# Patient Record
Sex: MALE | Race: WHITE | Employment: UNEMPLOYED | URBAN - METROPOLITAN AREA
[De-identification: names, ages, dates, MRNs, and addresses within clinical notes are randomized per-mention and may not be internally consistent; named-entity substitution may affect disease eponyms.]

---

## 2022-05-11 ENCOUNTER — APPOINTMENT (OUTPATIENT)
Dept: GENERAL RADIOLOGY | Age: 57
End: 2022-05-11
Payer: COMMERCIAL

## 2022-05-11 ENCOUNTER — HOSPITAL ENCOUNTER (EMERGENCY)
Age: 57
Discharge: HOME | End: 2022-05-11
Attending: EMERGENCY MEDICINE
Payer: COMMERCIAL

## 2022-05-11 VITALS
OXYGEN SATURATION: 100 % | HEART RATE: 58 BPM | SYSTOLIC BLOOD PRESSURE: 181 MMHG | HEIGHT: 72 IN | TEMPERATURE: 98.6 F | WEIGHT: 215 LBS | RESPIRATION RATE: 16 BRPM | BODY MASS INDEX: 29.12 KG/M2 | DIASTOLIC BLOOD PRESSURE: 77 MMHG

## 2022-05-11 DIAGNOSIS — R07.9 CHEST PAIN, UNSPECIFIED TYPE: Primary | ICD-10-CM

## 2022-05-11 DIAGNOSIS — I10 HYPERTENSION, UNSPECIFIED TYPE: ICD-10-CM

## 2022-05-11 LAB
A/G RATIO: 1.4 (ref 1.1–2.2)
ALBUMIN SERPL-MCNC: 4.2 G/DL (ref 3.4–5)
ALP BLD-CCNC: 77 U/L (ref 40–129)
ALT SERPL-CCNC: 21 U/L (ref 10–40)
ANION GAP SERPL CALCULATED.3IONS-SCNC: 15 MMOL/L (ref 3–16)
AST SERPL-CCNC: 21 U/L (ref 15–37)
BASOPHILS ABSOLUTE: 0.1 K/UL (ref 0–0.2)
BASOPHILS RELATIVE PERCENT: 1.2 %
BILIRUB SERPL-MCNC: 0.4 MG/DL (ref 0–1)
BUN BLDV-MCNC: 9 MG/DL (ref 7–20)
CALCIUM SERPL-MCNC: 9.2 MG/DL (ref 8.3–10.6)
CHLORIDE BLD-SCNC: 95 MMOL/L (ref 99–110)
CO2: 20 MMOL/L (ref 21–32)
CREAT SERPL-MCNC: 1 MG/DL (ref 0.9–1.3)
EOSINOPHILS ABSOLUTE: 0.1 K/UL (ref 0–0.6)
EOSINOPHILS RELATIVE PERCENT: 0.9 %
GFR AFRICAN AMERICAN: >60
GFR NON-AFRICAN AMERICAN: >60
GLUCOSE BLD-MCNC: 118 MG/DL (ref 70–99)
HCT VFR BLD CALC: 40.3 % (ref 40.5–52.5)
HEMOGLOBIN: 14 G/DL (ref 13.5–17.5)
LYMPHOCYTES ABSOLUTE: 2 K/UL (ref 1–5.1)
LYMPHOCYTES RELATIVE PERCENT: 22.1 %
MCH RBC QN AUTO: 31.3 PG (ref 26–34)
MCHC RBC AUTO-ENTMCNC: 34.9 G/DL (ref 31–36)
MCV RBC AUTO: 89.8 FL (ref 80–100)
MONOCYTES ABSOLUTE: 0.7 K/UL (ref 0–1.3)
MONOCYTES RELATIVE PERCENT: 8.1 %
NEUTROPHILS ABSOLUTE: 6.1 K/UL (ref 1.7–7.7)
NEUTROPHILS RELATIVE PERCENT: 67.7 %
PDW BLD-RTO: 13 % (ref 12.4–15.4)
PLATELET # BLD: 323 K/UL (ref 135–450)
PMV BLD AUTO: 6.6 FL (ref 5–10.5)
POTASSIUM SERPL-SCNC: 3.7 MMOL/L (ref 3.5–5.1)
RBC # BLD: 4.48 M/UL (ref 4.2–5.9)
SODIUM BLD-SCNC: 130 MMOL/L (ref 136–145)
TOTAL PROTEIN: 7.3 G/DL (ref 6.4–8.2)
TROPONIN: <0.01 NG/ML
TROPONIN: <0.01 NG/ML
WBC # BLD: 9 K/UL (ref 4–11)

## 2022-05-11 PROCEDURE — 99285 EMERGENCY DEPT VISIT HI MDM: CPT

## 2022-05-11 PROCEDURE — 2580000003 HC RX 258: Performed by: EMERGENCY MEDICINE

## 2022-05-11 PROCEDURE — 84484 ASSAY OF TROPONIN QUANT: CPT

## 2022-05-11 PROCEDURE — 80053 COMPREHEN METABOLIC PANEL: CPT

## 2022-05-11 PROCEDURE — 93005 ELECTROCARDIOGRAM TRACING: CPT | Performed by: EMERGENCY MEDICINE

## 2022-05-11 PROCEDURE — 36415 COLL VENOUS BLD VENIPUNCTURE: CPT

## 2022-05-11 PROCEDURE — 6370000000 HC RX 637 (ALT 250 FOR IP): Performed by: EMERGENCY MEDICINE

## 2022-05-11 PROCEDURE — 85025 COMPLETE CBC W/AUTO DIFF WBC: CPT

## 2022-05-11 PROCEDURE — 71045 X-RAY EXAM CHEST 1 VIEW: CPT

## 2022-05-11 RX ORDER — FLUOXETINE 10 MG/1
10 CAPSULE ORAL DAILY
Status: ON HOLD | COMMUNITY
End: 2022-05-14

## 2022-05-11 RX ORDER — ONDANSETRON 4 MG/1
4 TABLET, ORALLY DISINTEGRATING ORAL ONCE
Status: COMPLETED | OUTPATIENT
Start: 2022-05-11 | End: 2022-05-11

## 2022-05-11 RX ORDER — ASPIRIN 81 MG/1
243 TABLET, CHEWABLE ORAL ONCE
Status: DISCONTINUED | OUTPATIENT
Start: 2022-05-11 | End: 2022-05-11

## 2022-05-11 RX ORDER — ASPIRIN 81 MG/1
324 TABLET, CHEWABLE ORAL ONCE
Status: DISCONTINUED | OUTPATIENT
Start: 2022-05-11 | End: 2022-05-11

## 2022-05-11 RX ORDER — LOSARTAN POTASSIUM 25 MG/1
25 TABLET ORAL ONCE
Status: COMPLETED | OUTPATIENT
Start: 2022-05-11 | End: 2022-05-11

## 2022-05-11 RX ORDER — LOSARTAN POTASSIUM 25 MG/1
50 TABLET ORAL DAILY
Qty: 60 TABLET | Refills: 0 | Status: ON HOLD | OUTPATIENT
Start: 2022-05-11 | End: 2022-05-14 | Stop reason: DRUGHIGH

## 2022-05-11 RX ORDER — SODIUM CHLORIDE 9 MG/ML
INJECTION, SOLUTION INTRAVENOUS CONTINUOUS
Status: DISCONTINUED | OUTPATIENT
Start: 2022-05-11 | End: 2022-05-12 | Stop reason: HOSPADM

## 2022-05-11 RX ORDER — LOSARTAN POTASSIUM 25 MG/1
25 TABLET ORAL DAILY
COMMUNITY
End: 2022-05-11 | Stop reason: SDUPTHER

## 2022-05-11 RX ADMIN — SODIUM CHLORIDE: 9 INJECTION, SOLUTION INTRAVENOUS at 21:05

## 2022-05-11 RX ADMIN — LOSARTAN POTASSIUM 25 MG: 25 TABLET, FILM COATED ORAL at 22:19

## 2022-05-11 RX ADMIN — NITROGLYCERIN 0.5 INCH: 20 OINTMENT TOPICAL at 19:55

## 2022-05-11 RX ADMIN — ONDANSETRON 4 MG: 4 TABLET, ORALLY DISINTEGRATING ORAL at 22:19

## 2022-05-11 ASSESSMENT — ENCOUNTER SYMPTOMS
WHEEZING: 0
VOMITING: 0
ABDOMINAL PAIN: 0
COUGH: 0
NAUSEA: 1
SHORTNESS OF BREATH: 1
COLOR CHANGE: 0

## 2022-05-11 ASSESSMENT — PAIN SCALES - GENERAL: PAINLEVEL_OUTOF10: 1

## 2022-05-11 ASSESSMENT — PAIN - FUNCTIONAL ASSESSMENT: PAIN_FUNCTIONAL_ASSESSMENT: 0-10

## 2022-05-11 ASSESSMENT — PAIN DESCRIPTION - LOCATION: LOCATION: CHEST

## 2022-05-11 NOTE — ED PROVIDER NOTES
Emergency Department Provider Note  Location: [facility] EMERGENCY DEPARTMENT  5/11/2022     Patient Identification  Pam Gonzalez is a 64 y.o. male    Chief Complaint  Chest Pain (CP that started yesterday, worse today. )      HPI  (History provided by patient)  This is a 64 y.o. male with a PMH significant for HTN presented today for chest pain. Patient reports substernal chest tightness that started yesterday while he was working on his broken . He checked his blood pressure and was elevated. Later on, the chest pain eases up and he felt better. Then this morning, he woke up and \"not feel right. \"  He said all day he feels fatigued, has minimal chest tightness rated 1/10 intensity, short of breath, and his hands felt clammy. Patient's wife initially said the patient took baby aspirin prior to arrival.  However later she said it was a 325 mg aspirin. ROS  Review of Systems   Constitutional: Positive for fatigue. HENT: Negative for congestion. Eyes: Negative for visual disturbance. Respiratory: Positive for shortness of breath. Negative for cough and wheezing. Cardiovascular: Positive for chest pain. Negative for palpitations and leg swelling. Gastrointestinal: Positive for nausea. Negative for abdominal pain and vomiting. Genitourinary: Negative for flank pain. Musculoskeletal: Negative for neck pain. Skin: Negative for color change. Patient said his hands feels clammy all day. Neurological: Negative for weakness and headaches. I have reviewed the following nursing documentation:  Allergies: No Known Allergies    Past medical history:  has a past medical history of Anxiety, Back pain, Gout, Hypertension, Routine health maintenance, and Sleep apnea. Past surgical history:  has a past surgical history that includes Endoscopy, colon, diagnostic (2015) and Colonoscopy (11/16/2015).     Home medications:   Prior to Admission medications    Medication Sig Start Date End Date Taking? Authorizing Provider   FLUoxetine (PROZAC) 10 MG capsule Take 10 mg by mouth daily   Yes Historical Provider, MD   losartan (COZAAR) 25 MG tablet Take 25 mg by mouth daily   Yes Historical Provider, MD       Social history:  reports that he has never smoked. His smokeless tobacco use includes snuff. He reports current alcohol use of about 12.5 standard drinks of alcohol per week. Family history:  History reviewed. No pertinent family history. Exam  ED Triage Vitals [05/11/22 1935]   BP Temp Temp src Pulse Resp SpO2 Height Weight   (!) 189/84 98.6 °F (37 °C) -- 75 17 100 % 6' (1.829 m) 215 lb (97.5 kg)   Physical Exam  Vitals and nursing note reviewed. Constitutional:       General: He is not in acute distress. Appearance: He is well-developed. He is not diaphoretic. HENT:      Head: Normocephalic and atraumatic. Eyes:      General: No scleral icterus. Right eye: No discharge. Left eye: No discharge. Conjunctiva/sclera: Conjunctivae normal.   Neck:      Trachea: No tracheal deviation. Cardiovascular:      Rate and Rhythm: Normal rate and regular rhythm. Heart sounds: Normal heart sounds. No murmur heard. Pulmonary:      Effort: Pulmonary effort is normal. No respiratory distress. Breath sounds: Normal breath sounds. No stridor. No wheezing. Chest:      Chest wall: No tenderness. Abdominal:      General: There is no distension. Palpations: Abdomen is soft. Tenderness: There is no abdominal tenderness. There is no guarding or rebound. Musculoskeletal:         General: No deformity. Cervical back: Neck supple. Right lower leg: No edema. Left lower leg: No edema. Skin:     General: Skin is warm and dry. Findings: No erythema or rash. Neurological:      Mental Status: He is alert and oriented to person, place, and time. Cranial Nerves: No dysarthria or facial asymmetry.    Psychiatric:         Mood and Affect: Mood and affect normal.         Behavior: Behavior normal. Behavior is cooperative. MDM/ED Course  ED Medication Orders (From admission, onward)    Start Ordered     Status Ordering Provider    05/11/22 2015 05/11/22 1948  nitroglycerin (NITRO-BID) 2 % ointment 0.5 inch  ONCE         Last MAR action: Given - by Tony Moore on 05/11/22 at [address] [place] [address]          EKG  The Ekg interpreted by me in the absence of a cardiologist shows. normal sinus rhythm with a rate of 78  Axis is   Normal  QTc is  normal  Intervals and Durations are unremarkable. No specific ST-T wave changes appreciated. No evidence of acute ischemia. No significant change from prior EKG dated 3/24/14    EKG#2  The Ekg interpreted by me in the absence of a cardiologist shows. normal sinus rhythm with a rate of 65  Axis is   Normal  QTc is  normal  Intervals and Durations are unremarkable. No specific ST-T wave changes appreciated. No evidence of acute ischemia. No significant change from prior EKG dated earlier today         Radiology  XR CHEST PORTABLE    Result Date: 5/11/2022  EXAMINATION: ONE XRAY VIEW OF THE CHEST 5/11/2022 7:50 pm COMPARISON: 09/09/2015. HISTORY: ORDERING SYSTEM PROVIDED HISTORY: CP TECHNOLOGIST PROVIDED HISTORY: Reason for exam:->CP Reason for Exam: Chest pain FINDINGS: The cardiomediastinal silhouette is unremarkable. Lungs are clear. No infiltrate, pleural fluid or evidence of overt failure. No acute cardiopulmonary disease.          Labs  Results for orders placed or performed during the hospital encounter of 05/11/22   CBC with Auto Differential   Result Value Ref Range    WBC 9.0 4.0 - 11.0 K/uL    RBC 4.48 4.20 - 5.90 M/uL    Hemoglobin 14.0 13.5 - 17.5 g/dL    Hematocrit 40.3 (L) 40.5 - 52.5 %    MCV 89.8 80.0 - 100.0 fL    MCH 31.3 26.0 - 34.0 pg    MCHC 34.9 31.0 - 36.0 g/dL    RDW 13.0 12.4 - 15.4 %    Platelets 383 313 - 190 K/uL    MPV 6.6 5.0 - 10.5 fL Neutrophils % 67.7 %    Lymphocytes % 22.1 %    Monocytes % 8.1 %    Eosinophils % 0.9 %    Basophils % 1.2 %    Neutrophils Absolute 6.1 1.7 - 7.7 K/uL    Lymphocytes Absolute 2.0 1.0 - 5.1 K/uL    Monocytes Absolute 0.7 0.0 - 1.3 K/uL    Eosinophils Absolute 0.1 0.0 - 0.6 K/uL    Basophils Absolute 0.1 0.0 - 0.2 K/uL   Comprehensive Metabolic Panel   Result Value Ref Range    Sodium 130 (L) 136 - 145 mmol/L    Potassium 3.7 3.5 - 5.1 mmol/L    Chloride 95 (L) 99 - 110 mmol/L    CO2 20 (L) 21 - 32 mmol/L    Anion Gap 15 3 - 16    Glucose 118 (H) 70 - 99 mg/dL    BUN 9 7 - 20 mg/dL    CREATININE 1.0 0.9 - 1.3 mg/dL    GFR Non-African American >60 >60    GFR African American >60 >60    Calcium 9.2 8.3 - 10.6 mg/dL    Total Protein 7.3 6.4 - 8.2 g/dL    Albumin 4.2 3.4 - 5.0 g/dL    Albumin/Globulin Ratio 1.4 1.1 - 2.2    Total Bilirubin 0.4 0.0 - 1.0 mg/dL    Alkaline Phosphatase 77 40 - 129 U/L    ALT 21 10 - 40 U/L    AST 21 15 - 37 U/L   Troponin   Result Value Ref Range    Troponin <0.01 <0.01 ng/mL   Troponin   Result Value Ref Range    Troponin <0.01 <0.01 ng/mL         - Patient seen and evaluated in room 1.  64 y.o. male presented for chest tightness, shortness of breath, fatigue. Chest pain was not reproducible on exam.  There was no unilateral leg swelling or pleuritic chest pain and therefore I am not concerned about PE.  - Patient was placed on telemetry during his/her ED stay and no malignant dysrhythmia observed. - Pertinent old records reviewed. Patient has not had a stress test since 2011    HEART SCORE:  History: +2 for high suspicion  EKG: +0 for normal EKG   Age: +1 for age 44-72 years  Risk factors (includes HLD, HTN, DM, tobacco use, obesity, and +FHx): +1 for 1-2 risk factors  Initial troponin: +0 for negative troponin    Heart score: 4.   This falls under the following category: Score of 4-6, which indicates low/moderate risk for major adverse cardiac event and supports observation with repeated troponins and/or non-invasive testing     I recommended admission to the hospital with the plan for a stress test tomorrow. However patient declined. He has about risks and benefit of such admission and after answer his question, he reaffirmed his decision to declined an admission. He subsequently discussed this with his wife and wife supportive of his decision as well. He did agree to a repeat EKG and troponin for further risk stratification. Both were normal and patient wanted to go home. I made it clear to the patient that he needs to contact with PCP as soon as possible to get further evaluation.  - Patient blood pressure is noted to be high. Patient said he was recently taken off his \"water pill\" after he was found to have low sodium. He was scheduled to have repeat blood work done to see if there is any improvement of his sodium level. Patient does not remember what was his prior lab value. Na 130 today. No confusion. I do not believe patient is at risk of seizure. Since patient is diuretic was discontinued and he has persistently elevated blood pressure here, I recommended that he we increase his losartan to 50 mg daily instead of 25 mg daily.  - Return precautions also discussed. patient verbalized understanding of care plan and agreed to follow-up with PCP as advised. I estimate there is LOW risk for ACUTE RESPIRATORY FAILURE, SEVERE COPD/ASTHMA EXACERBATION, ACUTE EXACERBATION OF CONGESTIVE HEART FAILURE, PERICARDIAL TAMPONADE, PNEUMONIA WITH HYPOXIA, PNEUMOTHORAX, PULMONARY EMBOLISM WITH RIGHT HEART STRAIN, SEPSIS, and THORACIC DISSECTION. I was concerned about ACS but after discussion with the patient and he made informed decisions, shared decision making was made and we agreed to discharge. Muriel Simms and I have discussed the diagnosis and risks, and we agree with discharging home to follow-up with PCP.   We also discussed returning to the Emergency Department immediately if new or worsening symptoms occur. We have discussed the symptoms which are most concerning (e.g., bloody sputum, fever, worsening pain or shortness of breath) that necessitate immediate return. Clinical Impression:  1. Chest pain, unspecified type    2. Hypertension, unspecified type          Disposition:  Discharge to home in good condition. Blood pressure (!) 181/77, pulse 58, temperature 98.6 °F (37 °C), resp. rate 16, height 6' (1.829 m), weight 215 lb (97.5 kg), SpO2 100 %. Patient was given scripts for the following medications. I counseled patient how to take these medications. Discharge Medication List as of 5/11/2022 10:13 PM      CONTINUE these medications which have CHANGED    Details   losartan (COZAAR) 25 MG tablet Take 2 tablets by mouth daily, Disp-60 tablet, R-0Normal               Disposition referral (if applicable):  Sissy Crews  [address] [address]  [phone]    Schedule an appointment as soon as possible for a visit in [address] [place] [address]. [facility] Emergency Department  [address] [place] [address],[address]  [phone]    As needed, If symptoms worsen        This chart was generated in part by using Dragon Dictation system and may contain errors related to that system including errors in grammar, punctuation, and spelling, as well as words and phrases that may be inappropriate. If there are any questions or concerns please feel free to contact the dictating provider for clarification.      Bob Dubon MD  [facility] Walter Glez MD  05/13/22 2288

## 2022-05-12 LAB
EKG ATRIAL RATE: 65 BPM
EKG ATRIAL RATE: 78 BPM
EKG DIAGNOSIS: NORMAL
EKG DIAGNOSIS: NORMAL
EKG P AXIS: 14 DEGREES
EKG P AXIS: 14 DEGREES
EKG P-R INTERVAL: 144 MS
EKG P-R INTERVAL: 144 MS
EKG Q-T INTERVAL: 416 MS
EKG Q-T INTERVAL: 454 MS
EKG QRS DURATION: 96 MS
EKG QRS DURATION: 96 MS
EKG QTC CALCULATION (BAZETT): 472 MS
EKG QTC CALCULATION (BAZETT): 474 MS
EKG R AXIS: 31 DEGREES
EKG R AXIS: 45 DEGREES
EKG T AXIS: 47 DEGREES
EKG T AXIS: 51 DEGREES
EKG VENTRICULAR RATE: 65 BPM
EKG VENTRICULAR RATE: 78 BPM

## 2022-05-14 ENCOUNTER — HOSPITAL ENCOUNTER (INPATIENT)
Age: 57
LOS: 2 days | Discharge: HOME | DRG: 311 | End: 2022-05-16
Attending: INTERNAL MEDICINE | Admitting: INTERNAL MEDICINE
Payer: COMMERCIAL

## 2022-05-14 PROBLEM — R07.9 CHEST PAIN: Status: ACTIVE | Noted: 2022-05-14

## 2022-05-14 LAB
ANION GAP SERPL CALCULATED.3IONS-SCNC: 11 MMOL/L (ref 3–16)
BILIRUBIN URINE: NEGATIVE
BLOOD, URINE: NEGATIVE
BUN BLDV-MCNC: 9 MG/DL (ref 7–20)
CALCIUM SERPL-MCNC: 9.1 MG/DL (ref 8.3–10.6)
CHLORIDE BLD-SCNC: 97 MMOL/L (ref 99–110)
CHLORIDE URINE RANDOM: 53 MMOL/L
CLARITY: CLEAR
CO2: 22 MMOL/L (ref 21–32)
COLOR: YELLOW
CREAT SERPL-MCNC: 1 MG/DL (ref 0.9–1.3)
CREATININE URINE: 49.2 MG/DL (ref 39–259)
GFR AFRICAN AMERICAN: >60
GFR NON-AFRICAN AMERICAN: >60
GLUCOSE BLD-MCNC: 122 MG/DL (ref 70–99)
GLUCOSE URINE: NEGATIVE MG/DL
HCT VFR BLD CALC: 39.7 % (ref 40.5–52.5)
HEMOGLOBIN: 13.7 G/DL (ref 13.5–17.5)
KETONES, URINE: NEGATIVE MG/DL
LEUKOCYTE ESTERASE, URINE: NEGATIVE
MCH RBC QN AUTO: 31.8 PG (ref 26–34)
MCHC RBC AUTO-ENTMCNC: 34.4 G/DL (ref 31–36)
MCV RBC AUTO: 92.5 FL (ref 80–100)
MICROSCOPIC EXAMINATION: NORMAL
NITRITE, URINE: NEGATIVE
PDW BLD-RTO: 12.7 % (ref 12.4–15.4)
PH UA: 6 (ref 5–8)
PLATELET # BLD: 275 K/UL (ref 135–450)
PMV BLD AUTO: 7.1 FL (ref 5–10.5)
POTASSIUM REFLEX MAGNESIUM: 4.6 MMOL/L (ref 3.5–5.1)
POTASSIUM, UR: 7.8 MMOL/L
PROTEIN UA: NEGATIVE MG/DL
RBC # BLD: 4.3 M/UL (ref 4.2–5.9)
SODIUM BLD-SCNC: 130 MMOL/L (ref 136–145)
SODIUM URINE: 49 MMOL/L
SPECIFIC GRAVITY UA: <=1.005 (ref 1–1.03)
TROPONIN: <0.01 NG/ML
TROPONIN: <0.01 NG/ML
URINE REFLEX TO CULTURE: NORMAL
URINE TYPE: NORMAL
UROBILINOGEN, URINE: 0.2 E.U./DL
WBC # BLD: 8.4 K/UL (ref 4–11)

## 2022-05-14 PROCEDURE — 6370000000 HC RX 637 (ALT 250 FOR IP): Performed by: INTERNAL MEDICINE

## 2022-05-14 PROCEDURE — 80061 LIPID PANEL: CPT

## 2022-05-14 PROCEDURE — 1200000000 HC SEMI PRIVATE

## 2022-05-14 PROCEDURE — 81003 URINALYSIS AUTO W/O SCOPE: CPT

## 2022-05-14 PROCEDURE — 36415 COLL VENOUS BLD VENIPUNCTURE: CPT

## 2022-05-14 PROCEDURE — 85027 COMPLETE CBC AUTOMATED: CPT

## 2022-05-14 PROCEDURE — 84300 ASSAY OF URINE SODIUM: CPT

## 2022-05-14 PROCEDURE — 82570 ASSAY OF URINE CREATININE: CPT

## 2022-05-14 PROCEDURE — 99223 1ST HOSP IP/OBS HIGH 75: CPT | Performed by: INTERNAL MEDICINE

## 2022-05-14 PROCEDURE — 96372 THER/PROPH/DIAG INJ SC/IM: CPT

## 2022-05-14 PROCEDURE — 84484 ASSAY OF TROPONIN QUANT: CPT

## 2022-05-14 PROCEDURE — 83930 ASSAY OF BLOOD OSMOLALITY: CPT

## 2022-05-14 PROCEDURE — 83935 ASSAY OF URINE OSMOLALITY: CPT

## 2022-05-14 PROCEDURE — 80048 BASIC METABOLIC PNL TOTAL CA: CPT

## 2022-05-14 PROCEDURE — 82436 ASSAY OF URINE CHLORIDE: CPT

## 2022-05-14 PROCEDURE — 96374 THER/PROPH/DIAG INJ IV PUSH: CPT

## 2022-05-14 PROCEDURE — 6360000002 HC RX W HCPCS: Performed by: INTERNAL MEDICINE

## 2022-05-14 PROCEDURE — G0378 HOSPITAL OBSERVATION PER HR: HCPCS

## 2022-05-14 PROCEDURE — 2580000003 HC RX 258: Performed by: INTERNAL MEDICINE

## 2022-05-14 PROCEDURE — 84133 ASSAY OF URINE POTASSIUM: CPT

## 2022-05-14 PROCEDURE — G0379 DIRECT REFER HOSPITAL OBSERV: HCPCS

## 2022-05-14 RX ORDER — SODIUM CHLORIDE 0.9 % (FLUSH) 0.9 %
5-40 SYRINGE (ML) INJECTION PRN
Status: DISCONTINUED | OUTPATIENT
Start: 2022-05-14 | End: 2022-05-16 | Stop reason: HOSPADM

## 2022-05-14 RX ORDER — ASPIRIN 81 MG/1
81 TABLET, CHEWABLE ORAL DAILY
COMMUNITY

## 2022-05-14 RX ORDER — ENOXAPARIN SODIUM 100 MG/ML
40 INJECTION SUBCUTANEOUS DAILY
Status: DISCONTINUED | OUTPATIENT
Start: 2022-05-14 | End: 2022-05-16 | Stop reason: HOSPADM

## 2022-05-14 RX ORDER — FLUOXETINE HYDROCHLORIDE 60 MG/1
60 TABLET, FILM COATED ORAL; ORAL DAILY
COMMUNITY

## 2022-05-14 RX ORDER — HYDRALAZINE HYDROCHLORIDE 20 MG/ML
10 INJECTION INTRAMUSCULAR; INTRAVENOUS EVERY 8 HOURS PRN
Status: DISCONTINUED | OUTPATIENT
Start: 2022-05-14 | End: 2022-05-14

## 2022-05-14 RX ORDER — ACETAMINOPHEN 650 MG/1
650 SUPPOSITORY RECTAL EVERY 6 HOURS PRN
Status: DISCONTINUED | OUTPATIENT
Start: 2022-05-14 | End: 2022-05-16 | Stop reason: HOSPADM

## 2022-05-14 RX ORDER — SODIUM CHLORIDE 0.9 % (FLUSH) 0.9 %
5-40 SYRINGE (ML) INJECTION EVERY 12 HOURS SCHEDULED
Status: DISCONTINUED | OUTPATIENT
Start: 2022-05-14 | End: 2022-05-16 | Stop reason: HOSPADM

## 2022-05-14 RX ORDER — LOSARTAN POTASSIUM 100 MG/1
100 TABLET ORAL DAILY
Status: DISCONTINUED | OUTPATIENT
Start: 2022-05-14 | End: 2022-05-16 | Stop reason: HOSPADM

## 2022-05-14 RX ORDER — SODIUM CHLORIDE 9 MG/ML
INJECTION, SOLUTION INTRAVENOUS PRN
Status: DISCONTINUED | OUTPATIENT
Start: 2022-05-14 | End: 2022-05-16 | Stop reason: HOSPADM

## 2022-05-14 RX ORDER — LOSARTAN POTASSIUM 100 MG/1
100 TABLET ORAL DAILY
COMMUNITY

## 2022-05-14 RX ORDER — FLUOXETINE HYDROCHLORIDE 20 MG/1
60 CAPSULE ORAL DAILY
Status: DISCONTINUED | OUTPATIENT
Start: 2022-05-14 | End: 2022-05-16 | Stop reason: HOSPADM

## 2022-05-14 RX ORDER — CLONIDINE HYDROCHLORIDE 0.1 MG/1
0.1 TABLET ORAL 2 TIMES DAILY
COMMUNITY

## 2022-05-14 RX ORDER — POLYETHYLENE GLYCOL 3350 17 G/17G
17 POWDER, FOR SOLUTION ORAL DAILY PRN
Status: DISCONTINUED | OUTPATIENT
Start: 2022-05-14 | End: 2022-05-16 | Stop reason: HOSPADM

## 2022-05-14 RX ORDER — ASPIRIN 81 MG/1
81 TABLET, CHEWABLE ORAL DAILY
Status: DISCONTINUED | OUTPATIENT
Start: 2022-05-15 | End: 2022-05-16 | Stop reason: HOSPADM

## 2022-05-14 RX ORDER — ONDANSETRON 2 MG/ML
4 INJECTION INTRAMUSCULAR; INTRAVENOUS EVERY 6 HOURS PRN
Status: DISCONTINUED | OUTPATIENT
Start: 2022-05-14 | End: 2022-05-16 | Stop reason: HOSPADM

## 2022-05-14 RX ORDER — ATORVASTATIN CALCIUM 40 MG/1
40 TABLET, FILM COATED ORAL NIGHTLY
Status: DISCONTINUED | OUTPATIENT
Start: 2022-05-14 | End: 2022-05-16 | Stop reason: HOSPADM

## 2022-05-14 RX ORDER — GAUZE BANDAGE 2" X 2"
100 BANDAGE TOPICAL DAILY
Status: DISCONTINUED | OUTPATIENT
Start: 2022-05-14 | End: 2022-05-16 | Stop reason: HOSPADM

## 2022-05-14 RX ORDER — CLONIDINE HYDROCHLORIDE 0.1 MG/1
0.1 TABLET ORAL 3 TIMES DAILY
Status: DISCONTINUED | OUTPATIENT
Start: 2022-05-14 | End: 2022-05-16 | Stop reason: HOSPADM

## 2022-05-14 RX ORDER — M-VIT,TX,IRON,MINS/CALC/FOLIC 27MG-0.4MG
1 TABLET ORAL DAILY
Status: DISCONTINUED | OUTPATIENT
Start: 2022-05-14 | End: 2022-05-16 | Stop reason: HOSPADM

## 2022-05-14 RX ORDER — HYDRALAZINE HYDROCHLORIDE 20 MG/ML
10 INJECTION INTRAMUSCULAR; INTRAVENOUS EVERY 4 HOURS PRN
Status: DISCONTINUED | OUTPATIENT
Start: 2022-05-14 | End: 2022-05-16 | Stop reason: HOSPADM

## 2022-05-14 RX ORDER — AMLODIPINE BESYLATE 5 MG/1
5 TABLET ORAL DAILY
COMMUNITY

## 2022-05-14 RX ORDER — AMLODIPINE BESYLATE 5 MG/1
5 TABLET ORAL DAILY
Status: DISCONTINUED | OUTPATIENT
Start: 2022-05-14 | End: 2022-05-14

## 2022-05-14 RX ORDER — ONDANSETRON 4 MG/1
4 TABLET, ORALLY DISINTEGRATING ORAL EVERY 8 HOURS PRN
Status: DISCONTINUED | OUTPATIENT
Start: 2022-05-14 | End: 2022-05-16 | Stop reason: HOSPADM

## 2022-05-14 RX ORDER — ACETAMINOPHEN 325 MG/1
650 TABLET ORAL EVERY 6 HOURS PRN
Status: DISCONTINUED | OUTPATIENT
Start: 2022-05-14 | End: 2022-05-16 | Stop reason: HOSPADM

## 2022-05-14 RX ORDER — HYDROCHLOROTHIAZIDE 25 MG/1
25 TABLET ORAL DAILY
Status: ON HOLD | COMMUNITY
End: 2022-05-14

## 2022-05-14 RX ORDER — AMLODIPINE BESYLATE 5 MG/1
10 TABLET ORAL DAILY
Status: DISCONTINUED | OUTPATIENT
Start: 2022-05-14 | End: 2022-05-16 | Stop reason: HOSPADM

## 2022-05-14 RX ADMIN — SODIUM CHLORIDE, PRESERVATIVE FREE 10 ML: 5 INJECTION INTRAVENOUS at 20:09

## 2022-05-14 RX ADMIN — ATORVASTATIN CALCIUM 40 MG: 40 TABLET, FILM COATED ORAL at 20:09

## 2022-05-14 RX ADMIN — AMLODIPINE BESYLATE 10 MG: 5 TABLET ORAL at 13:54

## 2022-05-14 RX ADMIN — ACETAMINOPHEN 650 MG: 325 TABLET ORAL at 18:16

## 2022-05-14 RX ADMIN — LOSARTAN POTASSIUM 100 MG: 100 TABLET, FILM COATED ORAL at 13:54

## 2022-05-14 RX ADMIN — Medication 1 TABLET: at 13:54

## 2022-05-14 RX ADMIN — FLUOXETINE 60 MG: 20 CAPSULE ORAL at 13:54

## 2022-05-14 RX ADMIN — SODIUM CHLORIDE, PRESERVATIVE FREE 10 ML: 5 INJECTION INTRAVENOUS at 13:55

## 2022-05-14 RX ADMIN — ENOXAPARIN SODIUM 40 MG: 100 INJECTION SUBCUTANEOUS at 13:54

## 2022-05-14 RX ADMIN — HYDRALAZINE HYDROCHLORIDE 10 MG: 20 INJECTION INTRAMUSCULAR; INTRAVENOUS at 17:14

## 2022-05-14 RX ADMIN — CLONIDINE HYDROCHLORIDE 0.1 MG: 0.1 TABLET ORAL at 20:09

## 2022-05-14 RX ADMIN — THIAMINE HCL TAB 100 MG 100 MG: 100 TAB at 13:54

## 2022-05-14 ASSESSMENT — PAIN SCALES - GENERAL
PAINLEVEL_OUTOF10: 0
PAINLEVEL_OUTOF10: 0
PAINLEVEL_OUTOF10: 3
PAINLEVEL_OUTOF10: 0

## 2022-05-14 ASSESSMENT — PAIN DESCRIPTION - LOCATION: LOCATION: HEAD

## 2022-05-14 NOTE — CONSULTS
[facility]   Cardiology Consultation   Date: 5/14/2022  Reason for Consultation: Chest pain  Consult Requesting Physician: Sharan Thomson MD     No chief complaint on file. HPI: Tyler Mcdonald is a 64 y.o. male with history of hypertension, mild sleep apnea was working on his broken  on 5/10/2022 when he noticed that he started having substernal chest pain. His blood pressure was elevated. The chest pain eased up but then he woke up the next day and he was not feeling right. He was feeling fatigued all day. He had chest tightness that rated 1/10 throughout the day with some shortness of breath. [facility] emergency room his blood pressure was 189/84. He was advised to be admitted but declined and went home    He continued to have chest pain with radiation to left arm and neck and went back to ER and transferred here. He was on losartan -HCTZ for several years but noted to be hyponatremic and it was sopped and he was advised to cut down n beer. Past Medical History:   Diagnosis Date    Anxiety     Back pain     Gout     Hypertension     Routine health maintenance     echo 11/07,     Sleep apnea     mild, sleep study 11/07        Past Surgical History:   Procedure Laterality Date    COLONOSCOPY  11/16/2015    diverticulosis    ENDOSCOPY, COLON, DIAGNOSTIC  2015    EGD Gastritis       No Known Allergies    Social History:  Reviewed. reports that he has never smoked. His smokeless tobacco use includes snuff. He reports current alcohol use of about 12.5 standard drinks of alcohol per week. Family History:  Reviewed. Family Hx was Reviewed. No relevant family history is present. Review of System:  All other systems reviewed and are negative except for that noted above.  Pertinent negatives are:     · General: negative for fever, chills   · Ophthalmic ROS: negative for - eye pain or loss of vision  · ENT ROS: negative for - headaches, sore throat · Respiratory: negative for - cough, sputum  · Cardiovascular: Reviewed in HPI  · Gastrointestinal: negative for - abdominal pain, diarrhea, N/V  · Hematology: negative for - bleeding, blood clots, bruising or jaundice  · Genito-Urinary:  negative for - Dysuria or incontinence  · Musculoskeletal: negative for - Joint swelling, muscle pain  · Neurological: negative for - confusion, dizziness, headaches   · Psychiatric: No anxiety, no depression. · Dermatological: negative for - rash    Physical Examination:  Vitals:    22 1000   BP: (!) 156/77   Pulse: 58   Resp: 16   Temp: 98.4 °F (36.9 °C)   SpO2: 98%      No intake/output data recorded. Wt Readings from Last 3 Encounters:   22 212 lb (96.2 kg)   22 215 lb (97.5 kg)   11/16/15 210 lb (95.3 kg)     Temp  Av.4 °F (36.9 °C)  Min: 98.4 °F (36.9 °C)  Max: 98.4 °F (36.9 °C)  Pulse  Av  Min: 58  Max: 58  BP  Min: 156/77  Max: 156/77  SpO2  Av %  Min: 98 %  Max: 98 %  No intake or output data in the 24 hours ending 22 1125    · Telemetry: Sinus rhythm   · Constitutional: Oriented. No distress. · Head: Normocephalic and atraumatic. · Mouth/Throat: Oropharynx is clear and moist.   · Eyes: Conjunctivae normal. EOM are normal.   · Neck: Neck supple. No rigidity. No JVD present. · Cardiovascular: Normal rate, regular rhythm, S1&S2. · Pulmonary/Chest: Bilateral respiratory sounds. No wheezes, No rhonchi. · Abdominal: Soft. Bowel sounds present. No distension, No tenderness. · Musculoskeletal: No tenderness. No edema    · Lymphadenopathy: Has no cervical adenopathy. · Neurological: Alert and oriented. Cranial nerve appears intact, No Gross deficit   · Skin: Skin is warm and dry. No rash noted. · Psychiatric: Has a normal behavior     Labs, diagnostic and imaging results reviewed. Reviewed.    Recent Labs     22   *   K 3.7   CL 95*   CO2 20*   BUN 9   CREATININE 1.0     Recent Labs 05/11/22  1940 05/14/22  1110   WBC 9.0 8.4   HGB 14.0 13.7   HCT 40.3* 39.7*   MCV 89.8 92.5    275     Lab Results   Component Value Date    TROPONINI <0.01 05/11/2022     No results found for: BNP  No results found for: PROTIME, INR  Lab Results   Component Value Date    CHOL 237 05/04/2015    HDL 58 05/04/2015    HDL 64 07/06/2010    TRIG 152 05/04/2015       ECG: Normal sinus rhythm,   Echo:   Cath:   Stress test 3/21/2011     Impression-   1. No evidence of reversible perfusion defect to suggest   myocardial ischemia. 2. LVEF 77 %       Scheduled Meds:   FLUoxetine  10 mg Oral Daily    sodium chloride flush  5-40 mL IntraVENous 2 times per day    [START ON 5/15/2022] aspirin  81 mg Oral Daily    atorvastatin  40 mg Oral Nightly    enoxaparin  40 mg SubCUTAneous Daily    sodium chloride flush  5-40 mL IntraVENous 2 times per day    amLODIPine  10 mg Oral Daily     Continuous Infusions:   sodium chloride      sodium chloride       PRN Meds:.sodium chloride flush, sodium chloride, ondansetron **OR** ondansetron, acetaminophen **OR** acetaminophen, polyethylene glycol, perflutren lipid microspheres, sodium chloride flush, sodium chloride     Patient Active Problem List    Diagnosis Date Noted    Chest pain 05/14/2022    Essential hypertension 09/04/2015    Lumbar disc disease with radiculopathy 05/04/2015    GERD (gastroesophageal reflux disease) 11/27/2012    Testosterone deficiency 07/12/2012    ED (erectile dysfunction) 08/08/2011    Hyperglycemia 07/07/2010    Gout 02/16/2010    Anxiety     Sleep apnea       Active Hospital Problems    Diagnosis Date Noted    Chest pain [R07.9] 05/14/2022     Priority: Medium       Assessment:       Plan:    -Chest pain  Troponin is negative. EKG is unremarkable. It has some typical features. Given that he is middle-age male with history of hypertension and hyperlipidemia, he has risk of coronary disease.   We will proceed with a stress test.      -Hypertension    Blood pressure is better controlled. Continue current management and adjust medications as needed. -Hyperlipidemia    Continue atorvastatin. -Hyponatremia  Adequate intake. Stop drinking beer. - alcohol use    Advised to quit. Thank you for allowing me to participate in the care of Hai Stevens     NOTE: This report was transcribed using voice recognition software. Every effort was made to ensure accuracy, however, inadvertent computerized transcription errors may be present.

## 2022-05-14 NOTE — H&P
HOSPITALISTS HISTORY AND PHYSICAL    5/14/2022 3:10 PM    Patient Information:  David Mcdaniel is a 64 y.o. male [id number]  PCP:  Cuca Clarke (Tel: [phone] )    Chief complaint:  No chief complaint on file. History of Present Illness:  Pepe Gomez is a 64 y.o. male  with PMHx of anxiety, back pain, hypertension and ERNESTO transferred from [facility] for evaluation of chest pain. Per patient's report, he has been experiencing intermittent chest pressure/pain from the past 2 days. Initially chest pain started while he was working on his broken washer on 5/10/2022 for which he went to [facility] ED where he was found to have elevated BP. Patient was advised to be admitted for management of elevated blood pressure and cardiology work-up but patient decided to go home. Patient continued to have intermittent chest pain with radiation to left arm and neck the next day and went back to the ER and was transferred to [facility] for further work-up. Patient denied any fever, chills, palpitations, shortness of breath PND, orthopnea, BLE edema, bowel or bladder dysfunction, recent travel, sick contact, any previous history of heart disease. REVIEW OF SYSTEMS:   Detailed 12 point ROS obtained which were negative except what mentioned above in HPI    Past Medical History:   has a past medical history of Anxiety, Back pain, Gout, Hypertension, Routine health maintenance, and Sleep apnea. Past Surgical History:   has a past surgical history that includes Endoscopy, colon, diagnostic (2015) and Colonoscopy (11/16/2015). Medications:  No current facility-administered medications on file prior to encounter.      Current Outpatient Medications on File Prior to Encounter   Medication Sig Dispense Refill    aspirin 81 MG chewable tablet Take 81 mg by mouth daily      FLUoxetine HCl 60 MG TABS Take 60 mg by mouth daily      losartan (COZAAR) 100 MG tablet Take 100 mg by mouth daily      amLODIPine (NORVASC) 5 MG tablet Take 5 mg by mouth daily      cloNIDine (CATAPRES) 0.1 MG tablet Take 0.1 mg by mouth 2 times daily For BP greater than 170/100         Allergies:  No Known Allergies     Social History:   reports that he has never smoked. His smokeless tobacco use includes snuff. He reports current alcohol use of about 12.5 standard drinks of alcohol per week. Family History:  family history is not on file. Physical Exam:  BP (!) 156/88   Pulse 65   Temp 98.5 °F (36.9 °C) (Oral)   Resp 16   Ht 6' (1.829 m)   Wt 212 lb (96.2 kg)   SpO2 99%   BMI 28.75 kg/m²     General appearance: No apparent distress appears stated age and cooperative. HEENT Normal cephalic, atraumatic without obvious deformity. Pupils equal, round, and reactive to light. Extra ocular muscles intact. Conjunctivae/corneas clear. Neck: Supple, No jugular venous distention/bruits. Trachea midline without thyromegaly or adenopathy   Lungs: Clear to auscultation, bilaterally without Rales/Wheezes/Rhonchi with good respiratory effort. Heart: Regular rate and rhythm with Normal S1/S2 without murmurs, rubs or gallops  Abdomen: Soft, non-tender or non-distended without rigidity or guarding and positive bowel sounds all four quadrants. Extremities: No clubbing, cyanosis, or edema bilaterally. Full range of motion without deformity and normal gait intact. Skin: Skin color, texture, turgor normal.  No rashes or lesions. Neurologic: Alert and oriented X 3, neurovascularly intact with sensory/motor intact upper extremities/lower extremities, bilaterally. Cranial nerves: II-XII intact, grossly non-focal.  Psychiatry: Appropriate affect.  Not agitated  Skin: Warm, dry, normal turgor, no rash  Brisk capillary refill, peripheral pulses palpable     Labs:  CBC:   Lab Results   Component Value Date    WBC 8.4 05/14/2022    RBC 4.30 05/14/2022    HGB 13.7 05/14/2022    HCT 39.7 05/14/2022    MCV 92.5 05/14/2022    MCH 31.8 05/14/2022    MCHC 34.4 05/14/2022    RDW 12.7 05/14/2022     05/14/2022    MPV 7.1 05/14/2022     BMP:    Lab Results   Component Value Date     05/14/2022    K 4.6 05/14/2022    CL 97 05/14/2022    CO2 22 05/14/2022    BUN 9 05/14/2022    CREATININE 1.0 05/14/2022    CALCIUM 9.1 05/14/2022    GFRAA >60 05/14/2022    GFRAA >60 04/03/2013    LABGLOM >60 05/14/2022    GLUCOSE 122 05/14/2022     NM MYOCARDIAL SPECT REST EXERCISE OR RX    (Results Pending)       Discussed case  with ED physician    Problem List  Principal Problem:    Chest pain  Active Problems:    Mixed hyperlipidemia    Hyponatremia    Benign essential HTN  Resolved Problems:    * No resolved hospital problems. *        Assessment/Plan:     Chest pain: Atypical versus ACS  Initial troponin negative, EKG unremarkable  Cardiology consulted: Appreciate the recs  Plan for cardiac stress test  Continue to monitor on telemetry and trend troponins    Hypertension: Continue amlodipine and losartan  Monitor BP closely    Hyperlipidemia: Continue statins    Hyponatremia likely 2/2 alcohol abuse/HCTZ: Serum sodium 130 on admission  Per patient report he has been taken off of HCTZ and he has been trying to cut beer  Urine, serum osmole and urine electrolytes studies ordered  Monitor serum sodium level closely    Hx of alcohol abuse: Denies any  Hx of alcohol withdrawal in the past  patient counseled on alcohol cessation; stated that he has cut down from 7-8 beers to 2 to 3/day but still not ready to give up his brace completely. Beer x2 ordered with meals   CIWA protocol, seizure precautions and multivitamins ordered  Monitor for withdrawal       DVT prophylaxis: Lovenox  Code status: Full    Admit as inpatient. I anticipate hospitalization spanning less than two midnights for investigation and treatment of the above medically necessary diagnoses.     Please note that some part of this chart was generated using Dragon dictation software. Although every effort was made to ensure the accuracy of this automated transcription, some errors in transcription may have occurred inadvertently. If you may need any clarification, please do not hesitate to contact me through [facility].        Kyler Montes MD    5/14/2022 3:10 PM

## 2022-05-15 LAB
ANION GAP SERPL CALCULATED.3IONS-SCNC: 12 MMOL/L (ref 3–16)
BUN BLDV-MCNC: 14 MG/DL (ref 7–20)
CALCIUM SERPL-MCNC: 9.1 MG/DL (ref 8.3–10.6)
CHLORIDE BLD-SCNC: 98 MMOL/L (ref 99–110)
CHOLESTEROL, TOTAL: 183 MG/DL (ref 0–199)
CO2: 22 MMOL/L (ref 21–32)
CREAT SERPL-MCNC: 1 MG/DL (ref 0.9–1.3)
EKG ATRIAL RATE: 62 BPM
EKG DIAGNOSIS: NORMAL
EKG P AXIS: 42 DEGREES
EKG P-R INTERVAL: 152 MS
EKG Q-T INTERVAL: 408 MS
EKG QRS DURATION: 100 MS
EKG QTC CALCULATION (BAZETT): 414 MS
EKG R AXIS: 41 DEGREES
EKG T AXIS: 20 DEGREES
EKG VENTRICULAR RATE: 62 BPM
GFR AFRICAN AMERICAN: >60
GFR NON-AFRICAN AMERICAN: >60
GLUCOSE BLD-MCNC: 115 MG/DL (ref 70–99)
HDLC SERPL-MCNC: 52 MG/DL (ref 40–60)
LDL CHOLESTEROL CALCULATED: 115 MG/DL
OSMOLALITY: 278 MOSM/KG (ref 275–295)
POTASSIUM SERPL-SCNC: 4 MMOL/L (ref 3.5–5.1)
SODIUM BLD-SCNC: 132 MMOL/L (ref 136–145)
TRIGL SERPL-MCNC: 78 MG/DL (ref 0–150)
VLDLC SERPL CALC-MCNC: 16 MG/DL

## 2022-05-15 PROCEDURE — 6370000000 HC RX 637 (ALT 250 FOR IP): Performed by: INTERNAL MEDICINE

## 2022-05-15 PROCEDURE — 93005 ELECTROCARDIOGRAM TRACING: CPT | Performed by: INTERNAL MEDICINE

## 2022-05-15 PROCEDURE — 96372 THER/PROPH/DIAG INJ SC/IM: CPT

## 2022-05-15 PROCEDURE — 6360000002 HC RX W HCPCS: Performed by: INTERNAL MEDICINE

## 2022-05-15 PROCEDURE — G0378 HOSPITAL OBSERVATION PER HR: HCPCS

## 2022-05-15 PROCEDURE — 36415 COLL VENOUS BLD VENIPUNCTURE: CPT

## 2022-05-15 PROCEDURE — 93010 ELECTROCARDIOGRAM REPORT: CPT | Performed by: INTERNAL MEDICINE

## 2022-05-15 PROCEDURE — 2580000003 HC RX 258: Performed by: INTERNAL MEDICINE

## 2022-05-15 PROCEDURE — 80048 BASIC METABOLIC PNL TOTAL CA: CPT

## 2022-05-15 PROCEDURE — 99233 SBSQ HOSP IP/OBS HIGH 50: CPT | Performed by: NURSE PRACTITIONER

## 2022-05-15 PROCEDURE — 1200000000 HC SEMI PRIVATE

## 2022-05-15 PROCEDURE — 99223 1ST HOSP IP/OBS HIGH 75: CPT | Performed by: INTERNAL MEDICINE

## 2022-05-15 RX ADMIN — CLONIDINE HYDROCHLORIDE 0.1 MG: 0.1 TABLET ORAL at 20:38

## 2022-05-15 RX ADMIN — Medication 1 TABLET: at 09:18

## 2022-05-15 RX ADMIN — SODIUM CHLORIDE, PRESERVATIVE FREE 10 ML: 5 INJECTION INTRAVENOUS at 09:19

## 2022-05-15 RX ADMIN — Medication 10 ML: at 20:40

## 2022-05-15 RX ADMIN — ASPIRIN 81 MG 81 MG: 81 TABLET ORAL at 09:19

## 2022-05-15 RX ADMIN — THIAMINE HCL TAB 100 MG 100 MG: 100 TAB at 09:18

## 2022-05-15 RX ADMIN — CLONIDINE HYDROCHLORIDE 0.1 MG: 0.1 TABLET ORAL at 16:14

## 2022-05-15 RX ADMIN — AMLODIPINE BESYLATE 10 MG: 5 TABLET ORAL at 09:18

## 2022-05-15 RX ADMIN — CLONIDINE HYDROCHLORIDE 0.1 MG: 0.1 TABLET ORAL at 09:18

## 2022-05-15 RX ADMIN — FLUOXETINE 60 MG: 20 CAPSULE ORAL at 09:18

## 2022-05-15 RX ADMIN — ENOXAPARIN SODIUM 40 MG: 100 INJECTION SUBCUTANEOUS at 09:17

## 2022-05-15 RX ADMIN — LOSARTAN POTASSIUM 100 MG: 100 TABLET, FILM COATED ORAL at 09:19

## 2022-05-15 RX ADMIN — SODIUM CHLORIDE, PRESERVATIVE FREE 10 ML: 5 INJECTION INTRAVENOUS at 20:40

## 2022-05-15 RX ADMIN — ATORVASTATIN CALCIUM 40 MG: 40 TABLET, FILM COATED ORAL at 20:38

## 2022-05-15 ASSESSMENT — PAIN SCALES - GENERAL
PAINLEVEL_OUTOF10: 0

## 2022-05-15 NOTE — PLAN OF CARE
Addended Estrella American on: 9/26/2019 10:59 AM     Modules accepted: Orders Problem: Pain  Goal: Verbalizes/displays adequate comfort level or baseline comfort level  Outcome: Progressing     Problem: Cardiovascular - Adult  Goal: Maintains optimal cardiac output and hemodynamic stability  Outcome: Progressing  Goal: Absence of cardiac dysrhythmias or at baseline  Outcome: Progressing     Problem: Musculoskeletal - Adult  Goal: Return mobility to safest level of function  Outcome: Progressing     Problem: Gastrointestinal - Adult  Goal: Maintains or returns to baseline bowel function  Outcome: Progressing     Problem: Infection - Adult  Goal: Absence of infection during hospitalization  Outcome: Progressing     Problem: Metabolic/Fluid and Electrolytes - Adult  Goal: Electrolytes maintained within normal limits  Outcome: Progressing  Goal: Hemodynamic stability and optimal renal function maintained  Outcome: Progressing     Problem: Hematologic - Adult  Goal: Maintains hematologic stability  Outcome: Progressing     Problem: ABCDS Injury Assessment  Goal: Absence of physical injury  Outcome: Progressing

## 2022-05-15 NOTE — PROGRESS NOTES
[address]   Cardiology Progress Note   Date: 5/15/2022  Admit Date: 5/14/2022     Reason for follow up: Chest Pain    Chief Complaint: CP  History of Present Illness: History obtained from patient and medical record. Julian Mosley is a 64 y.o. male with a past medical history of hypertension, gout, ERNESTO, anxiety who presented with CP. Interval Hx: Today, he is being seen for follow up. Denies CP today. No new complaints today. No major events overnight. Denies having chest pain, palpitations, shortness of breath, orthopnea/PND, cough, or dizziness. Patient seen and examined. Clinical notes reviewed. Telemetry reviewed. Assessment and Plan:  Chest Pain   - Denies CP overnight   - Trop neg and ECG unremarkable   - Some typical features with risk factors for CAD and therefore plan is for stress testing tomorrow am  Hypertension   - Uncontrolled. Consider adding agent tomorrow post stress testing with close PCP follow up  Hyperlipidemia   - Continue statin  ETOH use   - Recommend cessation    - NPO after midnight except water, no caffeine  - Stress test tomorrow     All pertinent information and plan of care discussed with the rounding/attending cardiologist.    Multiple medical conditions with risk of decompensation. All questions and concerns were addressed to the patient. Alternatives to my treatment were discussed. I have discussed the above stated plan with patient and the nurse. The patient verbalized understanding and agreed with the plan. Thank you for allowing to us to participate in the care of Julian Mosley.     Problem List:   Patient Active Problem List    Diagnosis Date Noted    Chest pain 05/14/2022    Mixed hyperlipidemia     Hyponatremia     Benign essential HTN 09/04/2015    Lumbar disc disease with radiculopathy 05/04/2015    GERD (gastroesophageal reflux disease) 11/27/2012    Testosterone deficiency 07/12/2012    ED (erectile dysfunction) 08/08/2011  Hyperglycemia 07/07/2010    Gout 02/16/2010    Anxiety     Sleep apnea       Allergies:  No Known Allergies    Home Meds:  Prior to Visit Medications    Medication Sig Taking? Authorizing Provider   aspirin 81 MG chewable tablet Take 81 mg by mouth daily Yes Historical Provider, MD   FLUoxetine HCl 60 MG TABS Take 60 mg by mouth daily Yes Historical Provider, MD   losartan (COZAAR) 100 MG tablet Take 100 mg by mouth daily Yes Historical Provider, MD   amLODIPine (NORVASC) 5 MG tablet Take 5 mg by mouth daily Yes Historical Provider, MD   cloNIDine (CATAPRES) 0.1 MG tablet Take 0.1 mg by mouth 2 times daily For BP greater than 170/100 Yes Historical Provider, MD      Scheduled Meds:   FLUoxetine  60 mg Oral Daily    sodium chloride flush  5-40 mL IntraVENous 2 times per day    aspirin  81 mg Oral Daily    atorvastatin  40 mg Oral Nightly    enoxaparin  40 mg SubCUTAneous Daily    sodium chloride flush  5-40 mL IntraVENous 2 times per day    amLODIPine  10 mg Oral Daily    losartan  100 mg Oral Daily    thiamine mononitrate  100 mg Oral Daily    therapeutic multivitamin-minerals  1 tablet Oral Daily    cloNIDine  0.1 mg Oral TID     Continuous Infusions:   sodium chloride      sodium chloride       PRN Meds:sodium chloride flush, sodium chloride, ondansetron **OR** ondansetron, acetaminophen **OR** acetaminophen, polyethylene glycol, perflutren lipid microspheres, sodium chloride flush, sodium chloride, hydrALAZINE     Past Medical History:  Past Medical History:   Diagnosis Date    Anxiety     Back pain     Gout     Hypertension     Routine health maintenance     echo 11/07,     Sleep apnea     mild, sleep study 11/07        Past Surgical History:    has a past surgical history that includes Endoscopy, colon, diagnostic (2015) and Colonoscopy (11/16/2015). Social History:  Reviewed. reports that he has never smoked. His smokeless tobacco use includes snuff.  He reports current alcohol use of about 12.5 standard drinks of alcohol per week. Family History:  Reviewed. family history is not on file. Denies family history of sudden cardiac death, arrhythmia, premature CAD    Review of Systems:  · Constitutional: Negative for fever, weight changes, or weakness  · Skin: Negative for bruising, bleeding, blood clots, or changes in skin pigment  · HEENT: Negative for vision changes or dysphagia  · Respiratory: Reviewed in HPI  · Cardiovascular: Reviewed in HPI  · Gastrointestinal: Negative for abdominal pain or black/tarry stools  · Genito-Urinary: Negative for hematuria  · Musculoskeletal: No focal weakness  · Neurological/Psych: Negative for confusion or TIA-like symptoms. Physical Examination:  Vitals:    05/15/22 0430   BP: (!) 151/80   Pulse: 67   Resp: 18   Temp: 98.4 °F (36.9 °C)   SpO2: 99%      In: 240 [P.O.:240]  Out: 1050    Wt Readings from Last 3 Encounters:   05/15/22 208 lb 5.4 oz (94.5 kg)   05/11/22 215 lb (97.5 kg)   11/16/15 210 lb (95.3 kg)       Intake/Output Summary (Last 24 hours) at 5/15/2022 0738  Last data filed at 5/15/2022 0433  Gross per 24 hour   Intake 240 ml   Output 1050 ml   Net -810 ml     Telemetry: Personally Reviewed Normal sinus rhythm/SB  · Constitutional: Cooperative and in no apparent distress, and appears well nourished  · Skin: Warm and pink; no cyanosis, bruising, or clubbing  · HEENT: Symmetric and normocephalic. Conjunctiva pink with clear sclera. Mucus membranes pink and moist.   · Cardiovascular: regular rate and rhythm. S1 & S2, negative for murmurs. Peripheral pulses 2+, capillary refill < 3 seconds. negative elevation of JVP. · Respiratory: Respirations symmetric and unlabored. Lungs clear to auscultation bilaterally, no wheezing, crackles, or rhonchi  · Gastrointestinal: Abdomen soft and round. Bowel sounds normoactive in all quadrants. · Musculoskeletal: No focal weakness.   · Neurologic/Psych: Awake and orientated to person, place and time. Calm affect, appropriate mood    Pertinent labs, diagnostic, device, and imaging results reviewed as a part of this visit    Labs:    BMP:   Recent Labs     22  1110   *   K 4.6   CL 97*   CO2 22   BUN 9   CREATININE 1.0     Estimated Creatinine Clearance: 98 mL/min (based on SCr of 1 mg/dL).    CBC:   Recent Labs     22  1110   WBC 8.4   HGB 13.7   HCT 39.7*   MCV 92.5        Thyroid:   Lab Results   Component Value Date    TSH 2.80 2015     Lipids:   Lab Results   Component Value Date    CHOL 183 2022    HDL 52 2022    HDL 64 2010    TRIG 78 2022     LFTS:   Lab Results   Component Value Date    ALT 21 2022    AST 21 2022    ALKPHOS 77 2022    PROT 7.3 2022    PROT 7.1 2012    AGRATIO 1.4 2022    BILITOT 0.4 2022     Cardiac Enzymes:   Lab Results   Component Value Date    TROPONINI <0.01 2022    TROPONINI <0.01 2022    TROPONINI <0.01 2022     Coags: No results found for: PROTIME, INR    EC/15/2022: SR/SA    ECHO:  none    Stress Test: none    Cath:none    CÉSAR Chung-CNP  [facility]   Office: [phone]

## 2022-05-15 NOTE — CONSULTS
Office : [phone]     Fax :[phone]       Nephrology Consult Note      Patient's Name: Rusty Erickson  4:46 PM  5/15/2022    Reason for Consult:  Hyponatremia       Requesting Physician:  Sony Steward      Chief Complaint: chest pain      History of Present Ilness:    Rusty Erickson is a 64 y.o. male with pmh of  anxiety, back pain, hypertension and ERNESTO transferred from OSH for evaluation of chest pain. Per patient's report, he has been experiencing intermittent chest pressure/pain from the past 2 days. Initially chest pain started while he was working on his broken washer on 5/10/2022 for which he went to [facility] ED where he was found to have elevated BP. Patient was advised to be admitted for management of elevated blood pressure and cardiology work-up but patient decided to go home. Has hyponatremia     Drinks 6-7 cans of beer daily     I/O last 3 completed shifts: In: 240 [P.O.:240]  Out: 0 [Urine:1050]    Past Medical History:   Diagnosis Date    Anxiety     Back pain     Gout     Hypertension     Routine health maintenance     echo 11/07,     Sleep apnea     mild, sleep study 11/07       Past Surgical History:   Procedure Laterality Date    COLONOSCOPY  11/16/2015    diverticulosis    ENDOSCOPY, COLON, DIAGNOSTIC  2015    EGD Gastritis       No family history on file. reports that he has never smoked. His smokeless tobacco use includes snuff. He reports current alcohol use of about 12.5 standard drinks of alcohol per week. Allergies:  Patient has no known allergies.     Current Medications:    FLUoxetine (PROZAC) capsule 60 mg, Daily  sodium chloride flush 0.9 % injection 5-40 mL, 2 times per day  sodium chloride flush 0.9 % injection 5-40 mL, PRN  0.9 % sodium chloride infusion, PRN  ondansetron (ZOFRAN-ODT) disintegrating tablet 4 mg, Q8H PRN   Or  ondansetron (ZOFRAN) injection 4 mg, Q6H PRN  acetaminophen (TYLENOL) tablet 650 mg, Q6H PRN   Or  acetaminophen (TYLENOL) suppository 650 mg, Q6H PRN  polyethylene glycol (GLYCOLAX) packet 17 g, Daily PRN  aspirin chewable tablet 81 mg, Daily  perflutren lipid microspheres (DEFINITY) injection 1.65 mg, ONCE PRN  atorvastatin (LIPITOR) tablet 40 mg, Nightly  enoxaparin (LOVENOX) injection 40 mg, Daily  sodium chloride flush 0.9 % injection 5-40 mL, 2 times per day  sodium chloride flush 0.9 % injection 5-40 mL, PRN  0.9 % sodium chloride infusion, PRN  amLODIPine (NORVASC) tablet 10 mg, Daily  losartan (COZAAR) tablet 100 mg, Daily  thiamine mononitrate tablet 100 mg, Daily  therapeutic multivitamin-minerals 1 tablet, Daily  cloNIDine (CATAPRES) tablet 0.1 mg, TID  hydrALAZINE (APRESOLINE) injection 10 mg, Q4H PRN        Review of Systems:   14 point ROS obtained but were negative except mentioned in HPI      Physical exam:     Vitals:  BP (!) 162/79   Pulse 71   Temp 97.9 °F (36.6 °C) (Oral)   Resp 18   Ht 6' (1.829 m)   Wt 208 lb 5.4 oz (94.5 kg)   SpO2 99%   BMI 28.26 kg/m²   Constitutional:  OAA X3 NAD  Skin: no rash, turgor wnl  Heent:  eomi, mmm  Neck: no bruits or jvd noted  Cardiovascular:  S1, S2 without m/r/g  Respiratory: CTA B without w/r/r  Abdomen:  +bs, soft, nt, nd  Ext: no  lower extremity edema  Psychiatric: mood and affect appropriate  Musculoskeletal:  Rom, muscular strength intact    Labs:  CBC:   Recent Labs     05/14/22  1110   WBC 8.4   HGB 13.7        BMP:    Recent Labs     05/14/22  1110 05/15/22  1111   * 132*   K 4.6 4.0   CL 97* 98*   CO2 22 22   BUN 9 14   CREATININE 1.0 1.0   GLUCOSE 122* 115*     Ca/Mg/Phos:   Recent Labs     05/14/22  1110 05/15/22  1111   CALCIUM 9.1 9.1     Hepatic: No results for input(s): AST, ALT, ALB, BILITOT, ALKPHOS in the last 72

## 2022-05-15 NOTE — PROGRESS NOTES
HOSPITALISTS PROGRESS NOTE    5/15/2022 9:07 AM        Name: Griselda Dupes . Admitted: 5/14/2022  Primary Care Provider: Blade Angelo (Tel: [phone])      Brief Course: This 64 y.o. male  with PMHx of anxiety, back pain, hypertension and ERNESTO transferred from [facility] for evaluation of chest pain. Per patient's report, he has been experiencing intermittent chest pressure/pain from the past 2 days. Initially chest pain started while he was working on his broken washer on 5/10/2022 for which he went to [facility] ED where he was found to have elevated BP. Patient was advised to be admitted for management of elevated blood pressure and cardiology work-up but patient decided to go home. Patient continued to have intermittent chest pain with radiation to left arm and neck the next day and went back to the ER and was transferred to [facility] for further work-up. Interval history:   Pt seen and examined today   Overnight events noted and interval ancillary notes  Hemodynamically stable.   Troponin remained flat  serum sodium remained around 130  Resting in bed denied any fevers, chills, chest pain, palpitations, cough, SOB, dizziness      Assessment & Plan:     Chest pain: Atypical versus ACS  Initial troponin negative, EKG unremarkable  Cardiology consulted: Appreciate the recs  Plan for cardiac stress test  Continue to monitor on telemetry and trend troponins     Hypertension: Continue amlodipine and losartan  Monitor BP closely     Hyperlipidemia: Continue statins     Hyponatremia likely 2/2 alcohol abuse/HCTZ: Serum sodium 130 on admission  Per patient report he has been taken off of HCTZ and he has been trying to cut beer  Urine, serum osmole and urine electrolytes studies ordered  Monitor serum sodium level closely     Hx of alcohol abuse: Denies any  Hx of alcohol withdrawal in the past  patient counseled on alcohol cessation; stated that he has cut down from 7-8 beers to 2 to 3/day but still not ready to give up his brace completely. Beer x2 ordered with meals   CIWA protocol, seizure precautions and multivitamins ordered  Monitor for withdrawal         DVT prophylaxis: Lovenox   Code:Full Code  Diet: Diet NPO  ADULT DIET; Regular; Low Sodium (2 gm); No Caffeine  ADULT ORAL NUTRITION SUPPLEMENT; Lunch, Dinner;  Other Oral Supplement; 1 can beer for lunch, 1 can beer for dinner  Diet NPO Exceptions are: Sips of Water with Meds, Ice Chips    Disposition:     Current Medications  FLUoxetine (PROZAC) capsule 60 mg, Daily  sodium chloride flush 0.9 % injection 5-40 mL, 2 times per day  sodium chloride flush 0.9 % injection 5-40 mL, PRN  0.9 % sodium chloride infusion, PRN  ondansetron (ZOFRAN-ODT) disintegrating tablet 4 mg, Q8H PRN   Or  ondansetron (ZOFRAN) injection 4 mg, Q6H PRN  acetaminophen (TYLENOL) tablet 650 mg, Q6H PRN   Or  acetaminophen (TYLENOL) suppository 650 mg, Q6H PRN  polyethylene glycol (GLYCOLAX) packet 17 g, Daily PRN  aspirin chewable tablet 81 mg, Daily  perflutren lipid microspheres (DEFINITY) injection 1.65 mg, ONCE PRN  atorvastatin (LIPITOR) tablet 40 mg, Nightly  enoxaparin (LOVENOX) injection 40 mg, Daily  sodium chloride flush 0.9 % injection 5-40 mL, 2 times per day  sodium chloride flush 0.9 % injection 5-40 mL, PRN  0.9 % sodium chloride infusion, PRN  amLODIPine (NORVASC) tablet 10 mg, Daily  losartan (COZAAR) tablet 100 mg, Daily  thiamine mononitrate tablet 100 mg, Daily  therapeutic multivitamin-minerals 1 tablet, Daily  cloNIDine (CATAPRES) tablet 0.1 mg, TID  hydrALAZINE (APRESOLINE) injection 10 mg, Q4H PRN        Objective:  BP (!) 151/80   Pulse 67   Temp 98.4 °F (36.9 °C) (Oral)   Resp 18   Ht 6' (1.829 m)   Wt 208 lb 5.4 oz (94.5 kg)   SpO2 99%   BMI 28.26 kg/m²     Intake/Output Summary (Last 24 hours) at 5/15/2022 0907  Last data filed at 5/15/2022 0433  Gross per 24 hour   Intake 240 ml Output 1050 ml   Net -810 ml      Wt Readings from Last 3 Encounters:   05/15/22 208 lb 5.4 oz (94.5 kg)   05/11/22 215 lb (97.5 kg)   11/16/15 210 lb (95.3 kg)       Physical Examination:   General appearance:  No apparent distress, appears stated age and cooperative. Eyes: Sclera clear. Pupils equal.  ENT: Moist oral mucosa. Trachea midline, no adenopathy. Cardiovascular: Regular rhythm, normal S1, S2. No murmur. No edema in lower extremities  Respiratory:Not using accessory muscles. Good inspiratory effort. Clear to auscultation bilaterally, no wheeze or crackles. GI: Abdomen soft, no tenderness, not distended, normal bowel sounds  Musculoskeletal: normal ROM, No cyanosis in digits  Neurology: CN 2-12 grossly intact. No speech or motor deficits  Psych: Normal affect. Alert and oriented in time, place and person  Skin: Warm, dry, normal turgor    Labs and Tests:  CBC:   Recent Labs     05/14/22  1110   WBC 8.4   HGB 13.7        BMP:    Recent Labs     05/14/22  1110   *   K 4.6   CL 97*   CO2 22   BUN 9   CREATININE 1.0   GLUCOSE 122*     Hepatic: No results for input(s): AST, ALT, ALB, BILITOT, ALKPHOS in the last 72 hours. NM MYOCARDIAL SPECT REST EXERCISE OR RX    (Results Pending)       Problem List  Principal Problem:    Chest pain  Active Problems:    Mixed hyperlipidemia    Hyponatremia    Benign essential HTN  Resolved Problems:    * No resolved hospital problems.  Arlene Dacosta MD   5/15/2022 9:07 AM

## 2022-05-16 VITALS
RESPIRATION RATE: 18 BRPM | SYSTOLIC BLOOD PRESSURE: 128 MMHG | HEIGHT: 72 IN | DIASTOLIC BLOOD PRESSURE: 75 MMHG | WEIGHT: 208.56 LBS | TEMPERATURE: 97.5 F | HEART RATE: 58 BPM | BODY MASS INDEX: 28.25 KG/M2 | OXYGEN SATURATION: 93 %

## 2022-05-16 LAB
LV EF: 58 %
LV EF: 75 %
LVEF MODALITY: NORMAL
LVEF MODALITY: NORMAL
OSMOLALITY URINE: 218 MOSM/KG (ref 390–1070)

## 2022-05-16 PROCEDURE — 93017 CV STRESS TEST TRACING ONLY: CPT | Performed by: INTERNAL MEDICINE

## 2022-05-16 PROCEDURE — A9502 TC99M TETROFOSMIN: HCPCS | Performed by: INTERNAL MEDICINE

## 2022-05-16 PROCEDURE — 99233 SBSQ HOSP IP/OBS HIGH 50: CPT | Performed by: INTERNAL MEDICINE

## 2022-05-16 PROCEDURE — 93306 TTE W/DOPPLER COMPLETE: CPT

## 2022-05-16 PROCEDURE — 3430000000 HC RX DIAGNOSTIC RADIOPHARMACEUTICAL: Performed by: INTERNAL MEDICINE

## 2022-05-16 PROCEDURE — 6370000000 HC RX 637 (ALT 250 FOR IP): Performed by: NURSE PRACTITIONER

## 2022-05-16 PROCEDURE — 78452 HT MUSCLE IMAGE SPECT MULT: CPT

## 2022-05-16 PROCEDURE — 94760 N-INVAS EAR/PLS OXIMETRY 1: CPT

## 2022-05-16 PROCEDURE — G0378 HOSPITAL OBSERVATION PER HR: HCPCS

## 2022-05-16 PROCEDURE — 2580000003 HC RX 258: Performed by: INTERNAL MEDICINE

## 2022-05-16 PROCEDURE — 6370000000 HC RX 637 (ALT 250 FOR IP): Performed by: INTERNAL MEDICINE

## 2022-05-16 RX ORDER — LANOLIN ALCOHOL/MO/W.PET/CERES
3 CREAM (GRAM) TOPICAL NIGHTLY PRN
Status: DISCONTINUED | OUTPATIENT
Start: 2022-05-16 | End: 2022-05-16 | Stop reason: HOSPADM

## 2022-05-16 RX ORDER — M-VIT,TX,IRON,MINS/CALC/FOLIC 27MG-0.4MG
1 TABLET ORAL DAILY
Qty: 30 TABLET | Refills: 0 | Status: SHIPPED | OUTPATIENT
Start: 2022-05-17

## 2022-05-16 RX ADMIN — MELATONIN TAB 3 MG 3 MG: 3 TAB at 00:51

## 2022-05-16 RX ADMIN — ASPIRIN 81 MG 81 MG: 81 TABLET ORAL at 11:57

## 2022-05-16 RX ADMIN — Medication 1 TABLET: at 11:57

## 2022-05-16 RX ADMIN — THIAMINE HCL TAB 100 MG 100 MG: 100 TAB at 11:57

## 2022-05-16 RX ADMIN — TETROFOSMIN 30 MILLICURIE: 1.38 INJECTION, POWDER, LYOPHILIZED, FOR SOLUTION INTRAVENOUS at 08:47

## 2022-05-16 RX ADMIN — SODIUM CHLORIDE, PRESERVATIVE FREE 10 ML: 5 INJECTION INTRAVENOUS at 11:57

## 2022-05-16 RX ADMIN — AMLODIPINE BESYLATE 10 MG: 5 TABLET ORAL at 11:57

## 2022-05-16 RX ADMIN — FLUOXETINE 60 MG: 20 CAPSULE ORAL at 11:57

## 2022-05-16 RX ADMIN — TETROFOSMIN 10 MILLICURIE: 1.38 INJECTION, POWDER, LYOPHILIZED, FOR SOLUTION INTRAVENOUS at 07:31

## 2022-05-16 RX ADMIN — LOSARTAN POTASSIUM 100 MG: 100 TABLET, FILM COATED ORAL at 11:57

## 2022-05-16 ASSESSMENT — PAIN SCALES - GENERAL
PAINLEVEL_OUTOF10: 0
PAINLEVEL_OUTOF10: 0

## 2022-05-16 NOTE — PROGRESS NOTES
Office : [phone]     Fax :[phone]       Nephrology progress  Note      Patient's Name: Venesas Larsen  8:47 AM  5/16/2022    Reason for Consult:  Hyponatremia       Requesting Physician:  Clif Zambrano      Chief Complaint: chest pain      History of Present Ilness:    Venessa Larsen is a 64 y.o. male with pmh of  anxiety, back pain, hypertension and ERNESTO transferred from OSH for evaluation of chest pain. Per patient's report, he has been experiencing intermittent chest pressure/pain from the past 2 days. Initially chest pain started while he was working on his broken washer on 5/10/2022 for which he went to [facility] ED where he was found to have elevated BP. Patient was advised to be admitted for management of elevated blood pressure and cardiology work-up but patient decided to go home. Has hyponatremia     Drinks 6-7 cans of beer daily     Interval  HX :    Sodium level improving. Good UOP. Continue to limit daily fluid intake. I/O last 3 completed shifts: In: 36 [P.O.:1320]  Out: 2725 [Urine:2725]    Past Medical History:   Diagnosis Date    Anxiety     Back pain     Gout     Hypertension     Routine health maintenance     echo 11/07,     Sleep apnea     mild, sleep study 11/07       Past Surgical History:   Procedure Laterality Date    COLONOSCOPY  11/16/2015    diverticulosis    ENDOSCOPY, COLON, DIAGNOSTIC  2015    EGD Gastritis       No family history on file. reports that he has never smoked. His smokeless tobacco use includes snuff. He reports current alcohol use of about 12.5 standard drinks of alcohol per week. Allergies:  Patient has no known allergies.     Current Medications:    melatonin tablet 3 mg, Nightly PRN  technetium tetrofosmin (Tc-MYOVIEW) injection 30 millicurie, ONCE PRN  FLUoxetine (PROZAC) capsule 60 mg, Daily  sodium chloride flush 0.9 % injection 5-40 mL, 2 times per day  sodium chloride flush 0.9 % injection 5-40 mL, PRN  0.9 % sodium chloride infusion, PRN  ondansetron (ZOFRAN-ODT) disintegrating tablet 4 mg, Q8H PRN   Or  ondansetron (ZOFRAN) injection 4 mg, Q6H PRN  acetaminophen (TYLENOL) tablet 650 mg, Q6H PRN   Or  acetaminophen (TYLENOL) suppository 650 mg, Q6H PRN  polyethylene glycol (GLYCOLAX) packet 17 g, Daily PRN  aspirin chewable tablet 81 mg, Daily  perflutren lipid microspheres (DEFINITY) injection 1.65 mg, ONCE PRN  atorvastatin (LIPITOR) tablet 40 mg, Nightly  enoxaparin (LOVENOX) injection 40 mg, Daily  sodium chloride flush 0.9 % injection 5-40 mL, 2 times per day  sodium chloride flush 0.9 % injection 5-40 mL, PRN  0.9 % sodium chloride infusion, PRN  amLODIPine (NORVASC) tablet 10 mg, Daily  losartan (COZAAR) tablet 100 mg, Daily  thiamine mononitrate tablet 100 mg, Daily  therapeutic multivitamin-minerals 1 tablet, Daily  cloNIDine (CATAPRES) tablet 0.1 mg, TID  hydrALAZINE (APRESOLINE) injection 10 mg, Q4H PRN          Physical exam:     Vitals:  BP (!) 143/80   Pulse 55   Temp 97.6 °F (36.4 °C) (Oral)   Resp (!) 6   Ht 6' (1.829 m)   Wt 208 lb 8.9 oz (94.6 kg)   SpO2 100%   BMI 28.29 kg/m²   Constitutional:  OAA X3 NAD  Skin: no rash, turgor wnl  Heent:  eomi, mmm  Neck: no bruits or jvd noted  Cardiovascular:  S1, S2 without m/r/g  Respiratory: CTA B without w/r/r  Abdomen:  +bs, soft, nt, nd  Ext: no  lower extremity edema      Labs:  CBC:   Recent Labs     05/14/22  1110   WBC 8.4   HGB 13.7        BMP:    Recent Labs     05/14/22  1110 05/15/22  1111   * 132*   K 4.6 4.0   CL 97* 98*   CO2 22 22   BUN 9 14   CREATININE 1.0 1.0   GLUCOSE 122* 115*     Ca/Mg/Phos:   Recent Labs     05/14/22  1110 05/15/22  1111   CALCIUM 9.1 9.1     Hepatic: No results for input(s): AST, ALT, ALB, BILITOT, ALKPHOS in the last 72 hours. Troponin:   Recent Labs     05/14/22  1110 05/14/22  1407   TROPONINI <0.01 <0.01     BNP: No results for input(s): BNP in the last 72 hours. Lipids:   Recent Labs     05/14/22  1407   CHOL 183   TRIG 78   HDL 52   LDLCALC 115*   LABVLDL 16     ABGs: No results for input(s): PHART, PO2ART, ZUG3MXY in the last 72 hours. INR: No results for input(s): INR in the last 72 hours. UA:  Recent Labs     05/14/22  1330   COLORU Yellow   CLARITYU Clear   GLUCOSEU Negative   BILIRUBINUR Negative   KETUA Negative   SPECGRAV <=1.005   BLOODU Negative   PHUR 6.0   PROTEINU Negative   UROBILINOGEN 0.2   NITRU Negative   LEUKOCYTESUR Negative   LABMICR Not Indicated   URINETYPE NotGiven      Urine Microscopic: No results for input(s): LABCAST, BACTERIA, COMU, HYALCAST, WBCUA, RBCUA, EPIU in the last 72 hours. Urine Culture: No results for input(s): LABURIN in the last 72 hours. Urine Chemistry:   Recent Labs     05/14/22  1330   CLUR 53   LABCREA 49.2   NAUR 49                IMAGING:  NM MYOCARDIAL SPECT REST EXERCISE OR RX    (Results Pending)         Assessment/Plan :      1. Hyponatremia   likely 2/2 excess fluid intake . D/w him in detail. Needs to cut down his excess beer intake. Limit fluid intake to 1500 ml     2. HTN. Dced HCTZ 2 weeks ago     Continue amlodipine, clonidine and losartan     3. Chest pain. Work up per cardiology   For stress test today     4. Acid- base disorder. monitor     5. Electrolytes.  Monitor             D/w primary team      Thank you for allowing us to participate in care of Keren Leal         Electronically signed by: Sesar Ragsdale MD, 5/16/2022, 8:47 AM      [facility] [address]  Office : [phone]  Fax :[phone]

## 2022-05-16 NOTE — PLAN OF CARE
Problem: Pain  Goal: Verbalizes/displays adequate comfort level or baseline comfort level  Outcome: Progressing     Problem: Cardiovascular - Adult  Goal: Maintains optimal cardiac output and hemodynamic stability  Outcome: Progressing     Problem: ABCDS Injury Assessment  Goal: Absence of physical injury  Outcome: Progressing

## 2022-05-16 NOTE — ADT AUTH CERT
Last H&P Note      H&P by Anette Tan MD at 5/14/2022  3:09 PM    Author: Anette Tan MD Specialty: Internal Medicine Author Type: Physician   Filed: 5/14/2022  3:29 PM Date of Service: 5/14/2022  3:09 PM Status: Signed   : Anette Tan MD (Physician)                                                                                                                      HOSPITALISTS HISTORY AND PHYSICAL     5/14/2022 3:10 PM     Patient Information:  Yonas Hilton is a 64 y.o. male [id number]  PCP:  Jorden Ibarra (Tel: [phone] )     Chief complaint:  No chief complaint on file.        History of Present Illness:  Beather Dancer is a 64 y.o. male  with PMHx of anxiety, back pain, hypertension and ERNESTO transferred from [facility] for evaluation of chest pain. Per patient's report, he has been experiencing intermittent chest pressure/pain from the past 2 days. Initially chest pain started while he was working on his broken washer on 5/10/2022 for which he went to [facility] ED where he was found to have elevated BP. Patient was advised to be admitted for management of elevated blood pressure and cardiology work-up but patient decided to go home. Patient continued to have intermittent chest pain with radiation to left arm and neck the next day and went back to the ER and was transferred to [facility] for further work-up.   Patient denied any fever, chills, palpitations, shortness of breath PND, orthopnea, BLE edema, bowel or bladder dysfunction, recent travel, sick contact, any previous history of heart disease.            REVIEW OF SYSTEMS:   Detailed 12 point ROS obtained which were negative except what mentioned above in HPI     Past Medical History:   has a past medical history of Anxiety, Back pain, Gout, Hypertension, Routine health maintenance, and Sleep apnea.      Past Surgical History:   has a past surgical history that includes Endoscopy, colon, diagnostic (2015) and Colonoscopy (11/16/2015).    Medications:  No current facility-administered medications on file prior to encounter.             Current Outpatient Medications on File Prior to Encounter   Medication Sig Dispense Refill    aspirin 81 MG chewable tablet Take 81 mg by mouth daily        FLUoxetine HCl 60 MG TABS Take 60 mg by mouth daily        losartan (COZAAR) 100 MG tablet Take 100 mg by mouth daily        amLODIPine (NORVASC) 5 MG tablet Take 5 mg by mouth daily        cloNIDine (CATAPRES) 0.1 MG tablet Take 0.1 mg by mouth 2 times daily For BP greater than 170/100             Allergies:  No Known Allergies      Social History:   reports that he has never smoked. His smokeless tobacco use includes snuff. He reports current alcohol use of about 12.5 standard drinks of alcohol per week.      Family History:  family history is not on file.      Physical Exam:  BP (!) 156/88   Pulse 65   Temp 98.5 °F (36.9 °C) (Oral)   Resp 16   Ht 6' (1.829 m)   Wt 212 lb (96.2 kg)   SpO2 99%   BMI 28.75 kg/m²      General appearance: No apparent distress appears stated age and cooperative. HEENT Normal cephalic, atraumatic without obvious deformity. Pupils equal, round, and reactive to light. Extra ocular muscles intact. Conjunctivae/corneas clear. Neck: Supple, No jugular venous distention/bruits. Trachea midline without thyromegaly or adenopathy   Lungs: Clear to auscultation, bilaterally without Rales/Wheezes/Rhonchi with good respiratory effort. Heart: Regular rate and rhythm with Normal S1/S2 without murmurs, rubs or gallops  Abdomen: Soft, non-tender or non-distended without rigidity or guarding and positive bowel sounds all four quadrants. Extremities: No clubbing, cyanosis, or edema bilaterally. Full range of motion without deformity and normal gait intact. Skin: Skin color, texture, turgor normal.  No rashes or lesions.   Neurologic: Alert and oriented X 3, neurovascularly intact with sensory/motor intact upper extremities/lower extremities, bilaterally. Cranial nerves: II-XII intact, grossly non-focal.  Psychiatry: Appropriate affect. Not agitated  Skin: Warm, dry, normal turgor, no rash  Brisk capillary refill, peripheral pulses palpable      Labs:  CBC:         Lab Results   Component Value Date     WBC 8.4 05/14/2022     RBC 4.30 05/14/2022     HGB 13.7 05/14/2022     HCT 39.7 05/14/2022     MCV 92.5 05/14/2022     MCH 31.8 05/14/2022     MCHC 34.4 05/14/2022     RDW 12.7 05/14/2022      05/14/2022     MPV 7.1 05/14/2022      BMP:          Lab Results   Component Value Date      05/14/2022     K 4.6 05/14/2022     CL 97 05/14/2022     CO2 22 05/14/2022     BUN 9 05/14/2022     CREATININE 1.0 05/14/2022     CALCIUM 9.1 05/14/2022     GFRAA >60 05/14/2022     GFRAA >60 04/03/2013     LABGLOM >60 05/14/2022     GLUCOSE 122 05/14/2022      NM MYOCARDIAL SPECT REST EXERCISE OR RX    (Results Pending)         Discussed case  with ED physician     Problem List  Principal Problem:    Chest pain  Active Problems:    Mixed hyperlipidemia    Hyponatremia    Benign essential HTN  Resolved Problems:    * No resolved hospital problems.  *         Assessment/Plan:      Chest pain: Atypical versus ACS  Initial troponin negative, EKG unremarkable  Cardiology consulted: Appreciate the recs  Plan for cardiac stress test  Continue to monitor on telemetry and trend troponins     Hypertension: Continue amlodipine and losartan  Monitor BP closely     Hyperlipidemia: Continue statins     Hyponatremia likely 2/2 alcohol abuse/HCTZ: Serum sodium 130 on admission  Per patient report he has been taken off of HCTZ and he has been trying to cut beer  Urine, serum osmole and urine electrolytes studies ordered  Monitor serum sodium level closely     Hx of alcohol abuse: Denies any  Hx of alcohol withdrawal in the past  patient counseled on alcohol cessation; stated that he has cut down from 7-8 beers to 2 to 3/day but still not ready to give up his brace completely. Beer x2 ordered with meals   CIWA protocol, seizure precautions and multivitamins ordered  Monitor for withdrawal         DVT prophylaxis: Lovenox  Code status: Full     Admit as inpatient. I anticipate hospitalization spanning less than two midnights for investigation and treatment of the above medically necessary diagnoses.     Please note that some part of this chart was generated using Dragon dictation software. Although every effort was made to ensure the accuracy of this automated transcription, some errors in transcription may have occurred inadvertently. If you may need any clarification, please do not hesitate to contact me through [phone] Sonia Silva MD    5/14/2022 3:10 PM                Utilization Reviews         Chest Pain - Care Day 1 (5/14/2022) by Nguyễn García RN       Review Entered Review Status   5/15/2022 16:21 Completed      Criteria Review      Care Day: 1 Care Date: 5/14/2022 Level of Care: Inpatient Floor    Guideline Day 1    Clinical Status    ( ) * Clinical Indications met    Activity    ( ) Bed rest    5/15/2022 4:21 PM EDT by Azar Lowe      up as tolerated    Routes    (X) Diet as tolerated    5/15/2022 4:21 PM EDT by Ayaan Simon DIET; Regular; Low Sodium (2 gm);  No Caffeine    Interventions    (X) Arrange stress test, invasive procedures as needed    5/15/2022 4:21 PM EDT by Lacy Mendieta MYOCARDIAL SPECT REST EXERCISE OR RX  ordered    Medications    (X) Possible oral nitrates    5/15/2022 4:21 PM EDT by Azar Lowe      hydrALAZINE (APRESOLINE) injection 10 mg  Dose: 10 mg  Freq: EVERY 8 HOURS PRN Route: IV x1    (X) Possible statin    5/15/2022 4:21 PM EDT by Azar Lowe      atorvastatin (LIPITOR) tablet 40 mg  Dose: 40 mg  Freq: NIGHTLY Route: PO    (X) Antihypertensive medication as needed to control blood pressure    5/15/2022 4:21 PM EDT by Azar Lowe      norvasc 10 mg po daily  clonidine 0.1 mg po tid  cozaar 100 mg po daily    * Milestone   Additional Notes   DATE: 5/14 direct admit         Chief Complaint/ED Presentation:   Venessa Larsen is a 64 y.o. male  with PMHx of anxiety, back pain, hypertension and ERNESTO transferred from [facility] for evaluation of chest pain.  Per patient's report, he has been experiencing intermittent chest pressure/pain from the past 2 days. Initially chest pain started while he was working on his broken washer on 5/10/2022 for which he went to [facility] ED where he was found to have elevated BP. Patient was advised to be admitted for management of elevated blood pressure and cardiology work-up but patient decided to go home. Bhavesh Armstrong continued to have intermittent chest pain with radiation to left arm and neck the next day and went back to the ER and was transferred to [facility] for further work-up.  Patient denied any fever, chills, palpitations, shortness of breath PND, orthopnea, BLE edema, bowel or bladder dysfunction, recent travel, sick contact, any previous history of heart disease. Relevant baselines: (lab values, vitals, o2 amount/delivery, etc.)   RA      Vitals:   BP (!) 1182/88   Pulse 65   Temp 98.5 °F (36.9 °C)   Resp 16     SpO2 99%             Abnl/Pertinent Labs/Radiology/Diagnostic Studies:   Na 130 -CL 97 -Glucose 122- Hct 39.7 -          Admission Diagnosis/Op Note:   Chest pain      Pertinent Medical History:    has a past medical history of Anxiety, Back pain, Gout, Hypertension, Routine health maintenance, and Sleep apnea. Physical Exam:   General appearance: No apparent distress appears stated age and cooperative. HEENT Normal cephalic, atraumatic without obvious deformity.  Pupils equal, round, and reactive to light.  Extra ocular muscles intact.  Conjunctivae/corneas clear.    Neck: Supple, No jugular venous distention/bruits.  Trachea midline without thyromegaly or adenopathy    Lungs: Clear to auscultation, bilaterally without Rales/Wheezes/Rhonchi with good respiratory effort. Heart: Regular rate and rhythm with Normal S1/S2 without murmurs, rubs or gallops   Abdomen: Soft, non-tender or non-distended without rigidity or guarding and positive bowel sounds all four quadrants. Extremities: No clubbing, cyanosis, or edema bilaterally.  Full range of motion without deformity and normal gait intact. Skin: Skin color, texture, turgor normal.  No rashes or lesions. Neurologic: Alert and oriented X 3, neurovascularly intact with sensory/motor intact upper extremities/lower extremities, bilaterally.  Cranial nerves: II-XII intact, grossly non-focal.   Psychiatry: Appropriate affect.  Not agitated   Skin: Warm, dry, normal turgor, no rash   Brisk capillary refill, peripheral pulses palpable       MD Consults/Assessments & Plans:   H&P    Assessment/Plan:        Chest pain: Atypical versus ACS   Initial troponin negative, EKG unremarkable   Cardiology consulted: Appreciate the recs   Plan for cardiac stress test   Continue to monitor on telemetry and trend troponins       Hypertension: Continue amlodipine and losartan   Monitor BP closely       Hyperlipidemia: Continue statins       Hyponatremia likely 2/2 alcohol abuse/HCTZ: Serum sodium 130 on admission   Per patient report he has been taken off of HCTZ and he has been trying to cut beer   Urine, serum osmole and urine electrolytes studies ordered   Monitor serum sodium level closely       Hx of alcohol abuse: Denies any  Hx of alcohol withdrawal in the past   patient counseled on alcohol cessation; stated that he has cut down from 7-8 beers to 2 to 3/day but still not ready to give up his brace completely.  Beer x2 ordered with meals    CIWA protocol, seizure precautions and multivitamins ordered   Monitor for withdrawal          Medications:   See in review      Orders:   Telemetry   Seizure and fall precautions   Osmolality urine   Echo   Alcohol and /or drug assessment            Chest Pain - Clinical Indications for Admission to Inpatient Care by Erika Garcia RN       Review Entered Review Status   5/15/2022 16:17 Completed      Criteria Review      Clinical Indications for Admission to Inpatient Care    Most Recent : Sherie Magana Most Recent Date: 5/15/2022 4:17 PM EDT

## 2022-05-16 NOTE — PROGRESS NOTES
[address] Daily Progress Note      Admit Date:  5/14/2022    No chief complaint on file. chest pain    Subjective:  Mr. Lotus Horne denies exertional chest pain, SOB/ASH, PND, palpitations, light-headedness, or edema. Objective:   BP (!) 143/80   Pulse 55   Temp 97.6 °F (36.4 °C) (Oral)   Resp (!) 6   Ht 6' (1.829 m)   Wt 208 lb 8.9 oz (94.6 kg)   SpO2 100%   BMI 28.29 kg/m²     Intake/Output Summary (Last 24 hours) at 5/16/2022 0848  Last data filed at 5/16/2022 0423  Gross per 24 hour   Intake 1080 ml   Output 1675 ml   Net -595 ml       TELEMETRY: Sinus     Physical Exam:  General:  Awake, alert, oriented x 3, NAD  Skin:  Warm and dry  Neck:  JVD flat  Chest:  normal air entry  Cardiovascular:  RRR S1S2, no S3, no mrmr  Abdomen:  Soft, ND, NT, No HSM  Extremities:  No edema    Medications:    FLUoxetine  60 mg Oral Daily    sodium chloride flush  5-40 mL IntraVENous 2 times per day    aspirin  81 mg Oral Daily    atorvastatin  40 mg Oral Nightly    enoxaparin  40 mg SubCUTAneous Daily    sodium chloride flush  5-40 mL IntraVENous 2 times per day    amLODIPine  10 mg Oral Daily    losartan  100 mg Oral Daily    thiamine mononitrate  100 mg Oral Daily    therapeutic multivitamin-minerals  1 tablet Oral Daily    cloNIDine  0.1 mg Oral TID      sodium chloride      sodium chloride       melatonin, sodium chloride flush, sodium chloride, ondansetron **OR** ondansetron, acetaminophen **OR** acetaminophen, polyethylene glycol, perflutren lipid microspheres, sodium chloride flush, sodium chloride, hydrALAZINE    Lab Data:  CBC:   Recent Labs     05/14/22  1110   WBC 8.4   HGB 13.7   HCT 39.7*   MCV 92.5        BMP:   Recent Labs     05/14/22  1110 05/15/22  1111   * 132*   K 4.6 4.0   CL 97* 98*   CO2 22 22   BUN 9 14   CREATININE 1.0 1.0     LIVER PROFILE: No results for input(s): AST, ALT, LIPASE, BILIDIR, BILITOT, ALKPHOS in the last 72 hours.     Invalid input(s): AMYLASE,  ALB  PT/INR: No results for input(s): PROTIME, INR in the last 72 hours. APTT: No results for input(s): APTT in the last 72 hours. BNP:  No results for input(s): BNP in the last 72 hours. IMAGING:    Stress test  Summary    Normal myocardial perfusion.    Normal LV size and systolic function. Assessment/Plan:  Principal Problem:  Chest Pain  Typical angina, normal troponin and ecg. Multiple risk factors for CAD. Stress test today is normal perfusion. Hypertension  Uncontrolled. Cont norvasc, losartan. Consider adding hctz. Recommend stop clonidine as outpatient. No bb due to bradycardia. Hyperlipidemia              - Continue statin  ETOH use              - Recommend cessation      OK to discharge home.  FU with PCP        Lynn Ramos MD, MD 5/16/2022 8:48 AM

## 2022-05-16 NOTE — PROGRESS NOTES
Patient instructed on Hank Protocol Stress Test Procedure including possible side effects and adverse reactions. Verbalizes knowledge and understanding and denies having any questions.

## 2022-05-16 NOTE — CARE COORDINATION
DISCHARGE PLANNING NOTE:  Patient's  Chart reviewed for discharge planning needs; admitted from home , A&O , independent  and it appears that patient has minimal needs for discharge at this time with readmission score of 5%. Discussed with patients nurse and requested that case management be notified if discharge needs are identified. Patient has active insurance with [facility] Husbands is listed as the PCP     Case management will continue to follow progress and update discharge plan as needed.

## 2022-05-16 NOTE — PROGRESS NOTES
Data- discharge order received, pt verbalized agreement to discharge, disposition to previous residence, no needs for HHC/DME. Action- discharge instructions prepared and given to pt, pt verbalized understanding. Medication information packet given r/t NEW and/or CHANGED prescriptions emphasizing name/purpose/side effects, pt verbalized understanding. Discharge instruction summary: Diet- cardiac, Activity- as [place], Primary Care Physician as follows: Tawny Burnett [phone] f/u appointment , immunizations reviewed, prescription medications filled pt's pharmacy. Inpatient surgical procedure precautions reviewed: none     1. WEIGHT: Admit Weight: 212 lb (96.2 kg) (05/14/22 1000)        Today  Weight: 208 lb 8.9 oz (94.6 kg) (05/16/22 0415)       2. O2 SAT.: SpO2: 93 % (05/16/22 1215)    Response- Pt belongings gathered, IV removed. Disposition is home (no HHC/DME needs), transported with friend, taken to lobby via w/c w/ this nurse, no complications.

## 2022-05-23 NOTE — DISCHARGE SUMMARY
Hospital Medicine Discharge Summary    Patient ID: Grecia Waldron      Patient's PCP: Jose F Light    Admit Date: 5/14/2022     Discharge Date: 5/16/2022     Admitting Physician: Estephania Abraham MD     Discharge Physician: Katie Martínez MD        Active Hospital Problems    Diagnosis     Chest pain [R07.9]      Priority: Medium    Mixed hyperlipidemia [E78.2]      Priority: Medium    Hyponatremia [E87.1]      Priority: Medium    Benign essential  [address] Course: This 64 y. o. male  with PMHx of anxiety, back pain, hypertension and ERNESTO transferred from [facility] for evaluation of chest pain.  Per patient's report, he has been experiencing intermittent chest pressure/pain from the past 2 days. Initially chest pain started while he was working on his broken washer on 5/10/2022 for which he went to [facility] ED where he was found to have elevated BP. Patient was advised to be admitted for management of elevated blood pressure and cardiology work-up but patient decided to go home. Nellie Landau continued to have intermittent chest pain with radiation to left arm and neck the next day and went back to the ER and was transferred to [facility] for further work-up.       Chest pain:  Initial troponin negative, EKG unremarkable  Cardiology was consulted and patient underwent cardiac stress test which was normal     Hypertension: Continue amlodipine and losartan    Hyperlipidemia: Continue statins     Hyponatremia likely 2/2 alcohol abuse/HCTZ: Serum sodium 130 on admission: Trended up  Per patient report he has been taken off of HCTZ and he has been trying to cut beer    Hx of alcohol abuse: Denies any  Hx of alcohol withdrawal in the past. patient counseled on alcohol cessation    Physical Exam Performed:     /75   Pulse 58   Temp 97.5 °F (36.4 °C) (Oral)   Resp 18   Ht 6' (1.829 m)   Wt 208 lb 8.9 oz (94.6 kg)   SpO2 93%   BMI 28.29 kg/m²     General appearance:  No apparent distress, appears stated age and cooperative. Eyes: Sclera clear. Pupils equal.  ENT: Moist oral mucosa. Trachea midline, no adenopathy. Cardiovascular: Regular rhythm, normal S1, S2. No murmur. No edema in lower extremities  Respiratory:Not usingaccessory muscles. Good inspiratory effort. Clear to auscultation bilaterally, no wheeze or crackles. GI: Abdomen soft, no tenderness, not distended, normal bowel sounds  Musculoskeletal: Normal ROM, No cyanosis in digits. Neurology: CN 2-12 grossly intact. No speech or motor deficits  Psych: Normal affect. Alert and oriented in time, place and person  Skin: Warm, dry, normal turgor      Consults:     IP CONSULT TO CARDIOLOGY  IP CONSULT TO SOCIAL WORK  IP CONSULT TO NEPHROLOGY    Disposition: Home    Condition at Discharge: Stable     Discharge Instructions/Follow-up:   Follow up with your PCP within 4-5 days of discharge & have PCP recheck sodium levels   Follow up with nephrology as instructed   Cut down on beer intake   Take all your medications as prescribed. Discharge Medications:     Discharge Medication List as of 5/16/2022 12:51 PM           Details   Multiple Vitamins-Minerals (THERAPEUTIC MULTIVITAMIN-MINERALS) tablet Take 1 tablet by mouth daily, Disp-30 tablet, R-0Normal              Details   aspirin 81 MG chewable tablet Take 81 mg by mouth dailyHistorical Med      FLUoxetine HCl 60 MG TABS Take 60 mg by mouth dailyHistorical Med      losartan (COZAAR) 100 MG tablet Take 100 mg by mouth dailyHistorical Med      amLODIPine (NORVASC) 5 MG tablet Take 5 mg by mouth dailyHistorical Med      cloNIDine (CATAPRES) 0.1 MG tablet Take 0.1 mg by mouth 2 times daily For BP greater than 170/100Historical Med               The patient was seen and examined on day of discharge and this discharge summary is in conjunction with any daily progress note from day of discharge. Time Spent on discharge is 45 minutes  in the examination, evaluation, counseling and review of medications and discharge plan. Note that greater  than 30 minutes was spent in preparing discharge papers, discussing discharge with patient, medication review, etc.     Signed:    Oscar Mcgrath MD   5/23/2022      Thank you Cuca Clarke for the opportunity to be involved in this patient's care. If you have any questions or concerns please feel free to contact me at [phone].

## 2023-02-10 ENCOUNTER — HOSPITAL ENCOUNTER (OUTPATIENT)
Dept: GENERAL RADIOLOGY | Age: 58
Discharge: HOME | End: 2023-02-10
Payer: COMMERCIAL

## 2023-02-10 ENCOUNTER — HOSPITAL ENCOUNTER (OUTPATIENT)
Age: 58
Discharge: HOME | End: 2023-02-10
Payer: COMMERCIAL

## 2023-02-10 DIAGNOSIS — R05.9 COUGH, UNSPECIFIED TYPE: ICD-10-CM

## 2023-02-10 PROCEDURE — 71046 X-RAY EXAM CHEST 2 VIEWS: CPT

## 2025-02-23 ENCOUNTER — APPOINTMENT (OUTPATIENT)
Dept: GENERAL RADIOLOGY | Age: 60
DRG: 193 | End: 2025-02-23
Payer: COMMERCIAL

## 2025-02-23 ENCOUNTER — HOSPITAL ENCOUNTER (INPATIENT)
Age: 60
LOS: 7 days | Discharge: HOME | DRG: 193 | End: 2025-03-02
Attending: EMERGENCY MEDICINE | Admitting: STUDENT IN AN ORGANIZED HEALTH CARE EDUCATION/TRAINING PROGRAM
Payer: COMMERCIAL

## 2025-02-23 DIAGNOSIS — J96.01 ACUTE RESPIRATORY FAILURE WITH HYPOXIA (HCC): ICD-10-CM

## 2025-02-23 DIAGNOSIS — J18.9 PNEUMONIA OF RIGHT LUNG DUE TO INFECTIOUS ORGANISM, UNSPECIFIED PART OF LUNG: ICD-10-CM

## 2025-02-23 DIAGNOSIS — N17.9 AKI (ACUTE KIDNEY INJURY): ICD-10-CM

## 2025-02-23 DIAGNOSIS — E87.1 HYPONATREMIA: ICD-10-CM

## 2025-02-23 DIAGNOSIS — J10.1 INFLUENZA A: Primary | ICD-10-CM

## 2025-02-23 LAB
ALBUMIN SERPL-MCNC: 3.5 G/DL (ref 3.4–5)
ALBUMIN/GLOB SERPL: 1.1 {RATIO} (ref 1.1–2.2)
ALP SERPL-CCNC: 87 U/L (ref 40–129)
ALT SERPL-CCNC: 41 U/L (ref 10–40)
ANION GAP SERPL CALCULATED.3IONS-SCNC: 14 MMOL/L (ref 3–16)
ANION GAP SERPL CALCULATED.3IONS-SCNC: 14 MMOL/L (ref 3–16)
AST SERPL-CCNC: 59 U/L (ref 15–37)
BASE EXCESS BLDV CALC-SCNC: -3.5 MMOL/L (ref -3–3)
BASOPHILS # BLD: 0 K/UL (ref 0–0.2)
BASOPHILS NFR BLD: 0.2 %
BILIRUB SERPL-MCNC: 0.3 MG/DL (ref 0–1)
BUN SERPL-MCNC: 27 MG/DL (ref 7–20)
BUN SERPL-MCNC: 27 MG/DL (ref 7–20)
CALCIUM SERPL-MCNC: 7.2 MG/DL (ref 8.3–10.6)
CALCIUM SERPL-MCNC: 8 MG/DL (ref 8.3–10.6)
CHLORIDE SERPL-SCNC: 89 MMOL/L (ref 99–110)
CHLORIDE SERPL-SCNC: 93 MMOL/L (ref 99–110)
CO2 BLDV-SCNC: 22 MMOL/L
CO2 SERPL-SCNC: 17 MMOL/L (ref 21–32)
CO2 SERPL-SCNC: 20 MMOL/L (ref 21–32)
COHGB MFR BLDV: 1.1 % (ref 0–1.5)
CREAT SERPL-MCNC: 1.3 MG/DL (ref 0.9–1.3)
CREAT SERPL-MCNC: 1.5 MG/DL (ref 0.9–1.3)
DEPRECATED RDW RBC AUTO: 12.9 % (ref 12.4–15.4)
EKG ATRIAL RATE: 102 BPM
EKG DIAGNOSIS: NORMAL
EKG P AXIS: 56 DEGREES
EKG P-R INTERVAL: 130 MS
EKG Q-T INTERVAL: 322 MS
EKG QRS DURATION: 84 MS
EKG QTC CALCULATION (BAZETT): 419 MS
EKG R AXIS: 53 DEGREES
EKG T AXIS: 50 DEGREES
EKG VENTRICULAR RATE: 102 BPM
EOSINOPHIL # BLD: 0 K/UL (ref 0–0.6)
EOSINOPHIL NFR BLD: 0 %
FLUAV RNA RESP QL NAA+PROBE: DETECTED
FLUBV RNA RESP QL NAA+PROBE: NOT DETECTED
GFR SERPLBLD CREATININE-BSD FMLA CKD-EPI: 53 ML/MIN/{1.73_M2}
GFR SERPLBLD CREATININE-BSD FMLA CKD-EPI: 63 ML/MIN/{1.73_M2}
GLUCOSE BLD-MCNC: 144 MG/DL (ref 70–99)
GLUCOSE SERPL-MCNC: 136 MG/DL (ref 70–99)
GLUCOSE SERPL-MCNC: 160 MG/DL (ref 70–99)
HCO3 BLDV-SCNC: 20.5 MMOL/L (ref 23–29)
HCT VFR BLD AUTO: 40.1 % (ref 40.5–52.5)
HGB BLD-MCNC: 14 G/DL (ref 13.5–17.5)
LACTATE BLDV-SCNC: 1.8 MMOL/L (ref 0.4–2)
LYMPHOCYTES # BLD: 0.4 K/UL (ref 1–5.1)
LYMPHOCYTES NFR BLD: 10.2 %
MAGNESIUM SERPL-MCNC: 1.86 MG/DL (ref 1.8–2.4)
MAGNESIUM SERPL-MCNC: 1.96 MG/DL (ref 1.8–2.4)
MCH RBC QN AUTO: 31 PG (ref 26–34)
MCHC RBC AUTO-ENTMCNC: 35 G/DL (ref 31–36)
MCV RBC AUTO: 88.6 FL (ref 80–100)
METHGB MFR BLDV: 0.3 %
MONOCYTES # BLD: 0.2 K/UL (ref 0–1.3)
MONOCYTES NFR BLD: 4.4 %
NEUTROPHILS # BLD: 3.1 K/UL (ref 1.7–7.7)
NEUTROPHILS NFR BLD: 85.2 %
NT-PROBNP SERPL-MCNC: 99 PG/ML (ref 0–124)
O2 THERAPY: ABNORMAL
PCO2 BLDV: 34.2 MMHG (ref 40–50)
PERFORMED ON: ABNORMAL
PH BLDV: 7.39 [PH] (ref 7.35–7.45)
PLATELET # BLD AUTO: 230 K/UL (ref 135–450)
PMV BLD AUTO: 7.8 FL (ref 5–10.5)
PO2 BLDV: 28.9 MMHG (ref 25–40)
POTASSIUM SERPL-SCNC: 3 MMOL/L (ref 3.5–5.1)
POTASSIUM SERPL-SCNC: 3.2 MMOL/L (ref 3.5–5.1)
PROT SERPL-MCNC: 6.7 G/DL (ref 6.4–8.2)
RBC # BLD AUTO: 4.53 M/UL (ref 4.2–5.9)
RSV BY PCR: NOT DETECTED
SAO2 % BLDV: 55 %
SARS-COV-2 RNA RESP QL NAA+PROBE: NOT DETECTED
SODIUM SERPL-SCNC: 123 MMOL/L (ref 136–145)
SODIUM SERPL-SCNC: 124 MMOL/L (ref 136–145)
TROPONIN, HIGH SENSITIVITY: 12 NG/L (ref 0–22)
WBC # BLD AUTO: 3.6 K/UL (ref 4–11)

## 2025-02-23 PROCEDURE — 96365 THER/PROPH/DIAG IV INF INIT: CPT

## 2025-02-23 PROCEDURE — 99285 EMERGENCY DEPT VISIT HI MDM: CPT

## 2025-02-23 PROCEDURE — 84484 ASSAY OF TROPONIN QUANT: CPT

## 2025-02-23 PROCEDURE — 6370000000 HC RX 637 (ALT 250 FOR IP): Performed by: STUDENT IN AN ORGANIZED HEALTH CARE EDUCATION/TRAINING PROGRAM

## 2025-02-23 PROCEDURE — 2500000003 HC RX 250 WO HCPCS: Performed by: INTERNAL MEDICINE

## 2025-02-23 PROCEDURE — 6360000002 HC RX W HCPCS: Performed by: INTERNAL MEDICINE

## 2025-02-23 PROCEDURE — 2700000000 HC OXYGEN THERAPY PER DAY

## 2025-02-23 PROCEDURE — 83880 ASSAY OF NATRIURETIC PEPTIDE: CPT

## 2025-02-23 PROCEDURE — 2580000003 HC RX 258: Performed by: INTERNAL MEDICINE

## 2025-02-23 PROCEDURE — 2580000003 HC RX 258: Performed by: PHYSICIAN ASSISTANT

## 2025-02-23 PROCEDURE — 96367 TX/PROPH/DG ADDL SEQ IV INF: CPT

## 2025-02-23 PROCEDURE — 93005 ELECTROCARDIOGRAM TRACING: CPT | Performed by: EMERGENCY MEDICINE

## 2025-02-23 PROCEDURE — 6360000002 HC RX W HCPCS: Performed by: EMERGENCY MEDICINE

## 2025-02-23 PROCEDURE — 94761 N-INVAS EAR/PLS OXIMETRY MLT: CPT

## 2025-02-23 PROCEDURE — 36415 COLL VENOUS BLD VENIPUNCTURE: CPT

## 2025-02-23 PROCEDURE — 6370000000 HC RX 637 (ALT 250 FOR IP): Performed by: PHYSICIAN ASSISTANT

## 2025-02-23 PROCEDURE — 6370000000 HC RX 637 (ALT 250 FOR IP): Performed by: INTERNAL MEDICINE

## 2025-02-23 PROCEDURE — 96375 TX/PRO/DX INJ NEW DRUG ADDON: CPT

## 2025-02-23 PROCEDURE — 85025 COMPLETE CBC W/AUTO DIFF WBC: CPT

## 2025-02-23 PROCEDURE — 93010 ELECTROCARDIOGRAM REPORT: CPT | Performed by: INTERNAL MEDICINE

## 2025-02-23 PROCEDURE — 87040 BLOOD CULTURE FOR BACTERIA: CPT

## 2025-02-23 PROCEDURE — 83735 ASSAY OF MAGNESIUM: CPT

## 2025-02-23 PROCEDURE — 82803 BLOOD GASES ANY COMBINATION: CPT

## 2025-02-23 PROCEDURE — 83930 ASSAY OF BLOOD OSMOLALITY: CPT

## 2025-02-23 PROCEDURE — 80053 COMPREHEN METABOLIC PANEL: CPT

## 2025-02-23 PROCEDURE — 94660 CPAP INITIATION&MGMT: CPT

## 2025-02-23 PROCEDURE — 2000000000 HC ICU R&B

## 2025-02-23 PROCEDURE — 87641 MR-STAPH DNA AMP PROBE: CPT

## 2025-02-23 PROCEDURE — 87637 SARSCOV2&INF A&B&RSV AMP PRB: CPT

## 2025-02-23 PROCEDURE — 5A09457 ASSISTANCE WITH RESPIRATORY VENTILATION, 24-96 CONSECUTIVE HOURS, CONTINUOUS POSITIVE AIRWAY PRESSURE: ICD-10-PCS | Performed by: STUDENT IN AN ORGANIZED HEALTH CARE EDUCATION/TRAINING PROGRAM

## 2025-02-23 PROCEDURE — 71045 X-RAY EXAM CHEST 1 VIEW: CPT

## 2025-02-23 PROCEDURE — 6360000002 HC RX W HCPCS: Performed by: PHYSICIAN ASSISTANT

## 2025-02-23 PROCEDURE — 83605 ASSAY OF LACTIC ACID: CPT

## 2025-02-23 RX ORDER — ACETAMINOPHEN 325 MG/1
650 TABLET ORAL EVERY 6 HOURS PRN
Status: DISCONTINUED | OUTPATIENT
Start: 2025-02-23 | End: 2025-03-02 | Stop reason: HOSPADM

## 2025-02-23 RX ORDER — SODIUM CHLORIDE 0.9 % (FLUSH) 0.9 %
5-40 SYRINGE (ML) INJECTION EVERY 12 HOURS SCHEDULED
Status: DISCONTINUED | OUTPATIENT
Start: 2025-02-23 | End: 2025-03-02 | Stop reason: HOSPADM

## 2025-02-23 RX ORDER — 0.9 % SODIUM CHLORIDE 0.9 %
1000 INTRAVENOUS SOLUTION INTRAVENOUS ONCE
Status: DISCONTINUED | OUTPATIENT
Start: 2025-02-23 | End: 2025-02-23

## 2025-02-23 RX ORDER — OSELTAMIVIR PHOSPHATE 75 MG/1
75 CAPSULE ORAL 2 TIMES DAILY
Status: COMPLETED | OUTPATIENT
Start: 2025-02-23 | End: 2025-02-28

## 2025-02-23 RX ORDER — ACETAMINOPHEN 650 MG/1
650 SUPPOSITORY RECTAL EVERY 6 HOURS PRN
Status: DISCONTINUED | OUTPATIENT
Start: 2025-02-23 | End: 2025-03-02 | Stop reason: HOSPADM

## 2025-02-23 RX ORDER — ENOXAPARIN SODIUM 100 MG/ML
30 INJECTION SUBCUTANEOUS 2 TIMES DAILY
Status: DISCONTINUED | OUTPATIENT
Start: 2025-02-24 | End: 2025-03-02 | Stop reason: HOSPADM

## 2025-02-23 RX ORDER — ACETAMINOPHEN 500 MG
1000 TABLET ORAL ONCE
Status: COMPLETED | OUTPATIENT
Start: 2025-02-23 | End: 2025-02-23

## 2025-02-23 RX ORDER — LORAZEPAM 2 MG/ML
0.5 INJECTION INTRAMUSCULAR ONCE
Status: COMPLETED | OUTPATIENT
Start: 2025-02-23 | End: 2025-02-23

## 2025-02-23 RX ORDER — ONDANSETRON 2 MG/ML
4 INJECTION INTRAMUSCULAR; INTRAVENOUS EVERY 6 HOURS PRN
Status: DISCONTINUED | OUTPATIENT
Start: 2025-02-23 | End: 2025-03-02 | Stop reason: HOSPADM

## 2025-02-23 RX ORDER — MECOBALAMIN 5000 MCG
5 TABLET,DISINTEGRATING ORAL NIGHTLY
Status: DISCONTINUED | OUTPATIENT
Start: 2025-02-23 | End: 2025-03-02 | Stop reason: HOSPADM

## 2025-02-23 RX ORDER — POTASSIUM CHLORIDE 750 MG/1
40 TABLET, EXTENDED RELEASE ORAL PRN
Status: DISCONTINUED | OUTPATIENT
Start: 2025-02-23 | End: 2025-03-02 | Stop reason: HOSPADM

## 2025-02-23 RX ORDER — POTASSIUM CHLORIDE 7.45 MG/ML
10 INJECTION INTRAVENOUS PRN
Status: DISCONTINUED | OUTPATIENT
Start: 2025-02-23 | End: 2025-03-02 | Stop reason: HOSPADM

## 2025-02-23 RX ORDER — SODIUM CHLORIDE 9 MG/ML
INJECTION, SOLUTION INTRAVENOUS CONTINUOUS
Status: DISCONTINUED | OUTPATIENT
Start: 2025-02-23 | End: 2025-02-23

## 2025-02-23 RX ORDER — SODIUM CHLORIDE 9 MG/ML
INJECTION, SOLUTION INTRAVENOUS PRN
Status: DISCONTINUED | OUTPATIENT
Start: 2025-02-23 | End: 2025-03-02 | Stop reason: HOSPADM

## 2025-02-23 RX ORDER — SODIUM CHLORIDE 0.9 % (FLUSH) 0.9 %
5-40 SYRINGE (ML) INJECTION PRN
Status: DISCONTINUED | OUTPATIENT
Start: 2025-02-23 | End: 2025-03-02 | Stop reason: HOSPADM

## 2025-02-23 RX ORDER — VANCOMYCIN 1.5 G/300ML
1500 INJECTION, SOLUTION INTRAVENOUS ONCE
Status: COMPLETED | OUTPATIENT
Start: 2025-02-23 | End: 2025-02-23

## 2025-02-23 RX ORDER — POLYETHYLENE GLYCOL 3350 17 G/17G
17 POWDER, FOR SOLUTION ORAL DAILY PRN
Status: DISCONTINUED | OUTPATIENT
Start: 2025-02-23 | End: 2025-03-02 | Stop reason: HOSPADM

## 2025-02-23 RX ORDER — OSELTAMIVIR PHOSPHATE 75 MG/1
75 CAPSULE ORAL ONCE
Status: COMPLETED | OUTPATIENT
Start: 2025-02-23 | End: 2025-02-23

## 2025-02-23 RX ORDER — 0.9 % SODIUM CHLORIDE 0.9 %
1000 INTRAVENOUS SOLUTION INTRAVENOUS ONCE
Status: COMPLETED | OUTPATIENT
Start: 2025-02-23 | End: 2025-02-23

## 2025-02-23 RX ORDER — ONDANSETRON 4 MG/1
4 TABLET, ORALLY DISINTEGRATING ORAL EVERY 8 HOURS PRN
Status: DISCONTINUED | OUTPATIENT
Start: 2025-02-23 | End: 2025-03-02 | Stop reason: HOSPADM

## 2025-02-23 RX ORDER — MAGNESIUM SULFATE IN WATER 40 MG/ML
2000 INJECTION, SOLUTION INTRAVENOUS PRN
Status: DISCONTINUED | OUTPATIENT
Start: 2025-02-23 | End: 2025-03-02 | Stop reason: HOSPADM

## 2025-02-23 RX ORDER — THIAMINE HYDROCHLORIDE 100 MG/ML
100 INJECTION, SOLUTION INTRAMUSCULAR; INTRAVENOUS DAILY
Status: DISCONTINUED | OUTPATIENT
Start: 2025-02-23 | End: 2025-02-25

## 2025-02-23 RX ADMIN — SODIUM CHLORIDE: 0.9 INJECTION, SOLUTION INTRAVENOUS at 18:35

## 2025-02-23 RX ADMIN — ACETAMINOPHEN 1000 MG: 500 TABLET ORAL at 16:26

## 2025-02-23 RX ADMIN — CEFEPIME 2000 MG: 2 INJECTION, POWDER, FOR SOLUTION INTRAVENOUS at 20:34

## 2025-02-23 RX ADMIN — Medication 5 MG: at 23:21

## 2025-02-23 RX ADMIN — VANCOMYCIN 1500 MG: 1.5 INJECTION, SOLUTION INTRAVENOUS at 16:29

## 2025-02-23 RX ADMIN — SODIUM CHLORIDE 1000 ML: 0.9 INJECTION, SOLUTION INTRAVENOUS at 14:31

## 2025-02-23 RX ADMIN — POTASSIUM CHLORIDE 10 MEQ: 7.46 INJECTION, SOLUTION INTRAVENOUS at 22:18

## 2025-02-23 RX ADMIN — LORAZEPAM 0.5 MG: 2 INJECTION, SOLUTION INTRAMUSCULAR; INTRAVENOUS at 17:02

## 2025-02-23 RX ADMIN — PIPERACILLIN AND TAZOBACTAM 3375 MG: 3; .375 INJECTION, POWDER, LYOPHILIZED, FOR SOLUTION INTRAVENOUS at 14:59

## 2025-02-23 RX ADMIN — POTASSIUM CHLORIDE 10 MEQ: 7.46 INJECTION, SOLUTION INTRAVENOUS at 20:24

## 2025-02-23 RX ADMIN — OSELTAMIVIR PHOSPHATE 75 MG: 75 CAPSULE ORAL at 16:26

## 2025-02-23 RX ADMIN — SODIUM CHLORIDE, PRESERVATIVE FREE 10 ML: 5 INJECTION INTRAVENOUS at 20:26

## 2025-02-23 RX ADMIN — OSELTAMIVIR PHOSPHATE 75 MG: 75 CAPSULE ORAL at 20:35

## 2025-02-23 RX ADMIN — VANCOMYCIN HYDROCHLORIDE 1000 MG: 1 INJECTION, POWDER, LYOPHILIZED, FOR SOLUTION INTRAVENOUS at 23:32

## 2025-02-23 RX ADMIN — POTASSIUM CHLORIDE 10 MEQ: 7.46 INJECTION, SOLUTION INTRAVENOUS at 21:30

## 2025-02-23 RX ADMIN — POTASSIUM CHLORIDE 10 MEQ: 7.46 INJECTION, SOLUTION INTRAVENOUS at 23:33

## 2025-02-23 NOTE — ED NOTES
Writer in room to give PO medications at this time. Patient O2 sat dropping with minor adjustments. Patient O2 sat in 80's. Patient O2 turned up to 15L via HFNC. Patient o2 sat remains at 90-91%. Dr. Mclaughlin notified. Patient to move to bigger room to be placed on bipap.

## 2025-02-23 NOTE — ED PROVIDER NOTES
[facility] EMERGENCY DEPARTMENT  EMERGENCY DEPARTMENT ENCOUNTER        Pt Name: Juarez Saavedra  MRN: [id number]  Birthdate 1965  Date of evaluation: 2/23/2025  Provider: Katt Sharpe PA-C  PCP: Rama Oquendo APRN - NP  Note Started: 3:55 PM EST 2/23/25       I have seen and evaluated this patient with my supervising physician Regi Mclaughlin MD.      CHIEF COMPLAINT       Chief Complaint   Patient presents with    Shortness of Breath     Pt. Reports dx with the flu one week ago, pt. Reports SOB and painful cough. Pt. Reports decreased appetite         HISTORY OF PRESENT ILLNESS: 1 or more Elements     History From: Patient and family            Chief Complaint: Shortness of breath    Juarez Saavedra is a 59 y.o. male who presents for the past 7 days patient has had shortness of breath productive cough and chest pain and tactile fever.  He describes this as tightness in the middle of his chest without radiation that has been constant.  He is feeling more short of breath recently.  He recently saw his PCP 2 days ago and was positive for influenza.  No other diagnostic workup performed.  He has on no medications at this time.  He has malaise fatigue decreased appetite.  He has had some intermittent vomiting and diarrhea throughout the week.  He denies history of COPD asthma or oxygen requirement.  No recent travel trauma surgery malignancy hormone use history of DVT PE, no leg swelling or pain.    Nursing Notes were all reviewed and agreed with or any disagreements were addressed in the HPI.    REVIEW OF SYSTEMS :      Review of Systems    Positives and Pertinent negatives as per HPI.     SURGICAL HISTORY     Past Surgical History:   Procedure Laterality Date    COLONOSCOPY  11/16/2015    diverticulosis    ENDOSCOPY, COLON, DIAGNOSTIC  2015    EGD Gastritis       CURRENTMEDICATIONS       Previous Medications    AMLODIPINE (NORVASC) 5 MG TABLET    Take 5 mg by mouth daily    ASPIRIN 81 MG

## 2025-02-23 NOTE — ED PROVIDER NOTES
Emergency Department Attending Provider Note  Location: [facility]  ED      Juarez Saavedra was evaluated in the Emergency Department. Although initial history and physical exam information was obtained by Katt Sharpe (who also dictated a record of this visit), I personally saw the patient and made/approved the management plan and take responsibility for the patient management.     Patient seen and evaluated.  Relevant records reviewed. Chronic medical conditions reviewed.    HPI: 59-year-old male with a history of hypertension and GERD presents with shortness of breath and cough.  He was diagnosed with the flu at urgent care yesterday.  He has been ill since last Sunday.  States his shortness of breath is worsening and he has had decreased appetite.  No chest pain.     Physical Exam: Appears dyspneic, hypoxic, coarse breath sounds bilaterally more so on the right.     MDM: Here the patient is hypoxic.  We have placed him on high flow nasal cannula 11 L.  He is in mild distress.  He is positive for influenza A here.  Mild leukopenia on CBC.  He is hyponatremic and hypokalemic on metabolic panel.  Mild GRACIE with a creatinine of 1.5.  We have started IV fluids and will replace his potassium.  He is febrile and we have given Tylenol and Tamiflu.  Chest x-ray shows large right sided pneumonia.  We will also start antibiotics and send blood cultures.  He will be admitted to the ICU.         I independently interpreted the following diagnostic test and studies:    Results for orders placed or performed during the hospital encounter of 02/23/25   COVID-19, Flu A/B, and RSV Combo    Specimen: Nasopharyngeal   Result Value Ref Range    SARS-CoV-2 RNA, RT PCR NOT DETECTED NOT DETECTED    RSV by PCR NOT DETECTED     Influenza A DETECTED (A) NOT DETECTED    Influenza B NOT DETECTED NOT DETECTED   CBC with Auto Differential   Result Value Ref Range    WBC 3.6 (L) 4.0 - 11.0 K/uL    RBC 4.53 4.20 - 5.90 M/uL

## 2025-02-23 NOTE — ED NOTES
Patient unable to answer provider questions without desating to mid 80's on 6L NC. Patient placed on High flow NC at this time @ 11LPM

## 2025-02-23 NOTE — ED NOTES
Patient moved to room and placed on oxygen. Patient O2 sat now 92% on 6L via NC. Patient encouraged to keep deep breathing through nose to increase oxygen level.

## 2025-02-24 ENCOUNTER — APPOINTMENT (OUTPATIENT)
Dept: GENERAL RADIOLOGY | Age: 60
DRG: 193 | End: 2025-02-24
Payer: COMMERCIAL

## 2025-02-24 PROBLEM — J18.9 MULTIFOCAL PNEUMONIA: Status: ACTIVE | Noted: 2025-02-24

## 2025-02-24 PROBLEM — J98.01 BRONCHOSPASM: Status: ACTIVE | Noted: 2025-02-24

## 2025-02-24 PROBLEM — N17.9 ACUTE KIDNEY INJURY: Status: ACTIVE | Noted: 2025-02-24

## 2025-02-24 PROBLEM — J96.01 ACUTE HYPOXEMIC RESPIRATORY FAILURE: Status: ACTIVE | Noted: 2025-02-24

## 2025-02-24 PROBLEM — J96.01 ACUTE RESPIRATORY FAILURE WITH HYPOXIA: Status: ACTIVE | Noted: 2025-02-24

## 2025-02-24 PROBLEM — E87.8 DISORDER OF ELECTROLYTES: Status: ACTIVE | Noted: 2025-02-24

## 2025-02-24 PROBLEM — N17.9 AKI (ACUTE KIDNEY INJURY): Status: ACTIVE | Noted: 2025-02-24

## 2025-02-24 PROBLEM — J10.1 INFLUENZA A: Status: ACTIVE | Noted: 2025-02-24

## 2025-02-24 LAB
ALBUMIN SERPL-MCNC: 2.9 G/DL (ref 3.4–5)
ANION GAP SERPL CALCULATED.3IONS-SCNC: 10 MMOL/L (ref 3–16)
ANION GAP SERPL CALCULATED.3IONS-SCNC: 11 MMOL/L (ref 3–16)
ANION GAP SERPL CALCULATED.3IONS-SCNC: 13 MMOL/L (ref 3–16)
ANION GAP SERPL CALCULATED.3IONS-SCNC: 15 MMOL/L (ref 3–16)
BACTERIA URNS QL MICRO: ABNORMAL /HPF
BASE EXCESS BLDV CALC-SCNC: -5.8 MMOL/L (ref -3–3)
BASOPHILS # BLD: 0 K/UL (ref 0–0.2)
BASOPHILS NFR BLD: 0.5 %
BILIRUB UR QL STRIP.AUTO: NEGATIVE
BUN SERPL-MCNC: 22 MG/DL (ref 7–20)
BUN SERPL-MCNC: 23 MG/DL (ref 7–20)
BUN SERPL-MCNC: 25 MG/DL (ref 7–20)
BUN SERPL-MCNC: 25 MG/DL (ref 7–20)
CA-I BLD-SCNC: 1.06 MMOL/L (ref 1.12–1.32)
CALCIUM SERPL-MCNC: 6.8 MG/DL (ref 8.3–10.6)
CALCIUM SERPL-MCNC: 7.3 MG/DL (ref 8.3–10.6)
CALCIUM SERPL-MCNC: 7.5 MG/DL (ref 8.3–10.6)
CALCIUM SERPL-MCNC: 7.5 MG/DL (ref 8.3–10.6)
CHLORIDE SERPL-SCNC: 100 MMOL/L (ref 99–110)
CHLORIDE SERPL-SCNC: 103 MMOL/L (ref 99–110)
CHLORIDE SERPL-SCNC: 104 MMOL/L (ref 99–110)
CHLORIDE SERPL-SCNC: 98 MMOL/L (ref 99–110)
CLARITY UR: CLEAR
CO2 BLDV-SCNC: 19 MMOL/L
CO2 SERPL-SCNC: 16 MMOL/L (ref 21–32)
CO2 SERPL-SCNC: 17 MMOL/L (ref 21–32)
CO2 SERPL-SCNC: 18 MMOL/L (ref 21–32)
CO2 SERPL-SCNC: 18 MMOL/L (ref 21–32)
COARSE GRAN CASTS #/AREA URNS LPF: ABNORMAL /LPF (ref 0–2)
COHGB MFR BLDV: 0.9 % (ref 0–1.5)
COLOR UR: YELLOW
CREAT SERPL-MCNC: 1 MG/DL (ref 0.9–1.3)
CREAT SERPL-MCNC: 1 MG/DL (ref 0.9–1.3)
CREAT SERPL-MCNC: 1.2 MG/DL (ref 0.9–1.3)
CREAT SERPL-MCNC: 1.3 MG/DL (ref 0.9–1.3)
DEPRECATED RDW RBC AUTO: 13 % (ref 12.4–15.4)
EOSINOPHIL # BLD: 0 K/UL (ref 0–0.6)
EOSINOPHIL NFR BLD: 0 %
EPI CELLS #/AREA URNS HPF: ABNORMAL /HPF (ref 0–5)
GFR SERPLBLD CREATININE-BSD FMLA CKD-EPI: 63 ML/MIN/{1.73_M2}
GFR SERPLBLD CREATININE-BSD FMLA CKD-EPI: 70 ML/MIN/{1.73_M2}
GFR SERPLBLD CREATININE-BSD FMLA CKD-EPI: 87 ML/MIN/{1.73_M2}
GFR SERPLBLD CREATININE-BSD FMLA CKD-EPI: 87 ML/MIN/{1.73_M2}
GLUCOSE BLD-MCNC: 128 MG/DL (ref 70–99)
GLUCOSE BLD-MCNC: 154 MG/DL (ref 70–99)
GLUCOSE BLD-MCNC: 210 MG/DL (ref 70–99)
GLUCOSE SERPL-MCNC: 122 MG/DL (ref 70–99)
GLUCOSE SERPL-MCNC: 137 MG/DL (ref 70–99)
GLUCOSE SERPL-MCNC: 162 MG/DL (ref 70–99)
GLUCOSE SERPL-MCNC: 184 MG/DL (ref 70–99)
GLUCOSE UR STRIP.AUTO-MCNC: NEGATIVE MG/DL
HCO3 BLDV-SCNC: 18.2 MMOL/L (ref 23–29)
HCT VFR BLD AUTO: 38.5 % (ref 40.5–52.5)
HGB BLD-MCNC: 13.4 G/DL (ref 13.5–17.5)
HGB UR QL STRIP.AUTO: ABNORMAL
KETONES UR STRIP.AUTO-MCNC: NEGATIVE MG/DL
LEUKOCYTE ESTERASE UR QL STRIP.AUTO: NEGATIVE
LYMPHOCYTES # BLD: 0.5 K/UL (ref 1–5.1)
LYMPHOCYTES NFR BLD: 14.9 %
MAGNESIUM SERPL-MCNC: 1.88 MG/DL (ref 1.8–2.4)
MCH RBC QN AUTO: 31.2 PG (ref 26–34)
MCHC RBC AUTO-ENTMCNC: 34.9 G/DL (ref 31–36)
MCV RBC AUTO: 89.4 FL (ref 80–100)
METHGB MFR BLDV: 0.3 %
MONOCYTES # BLD: 0.1 K/UL (ref 0–1.3)
MONOCYTES NFR BLD: 4.1 %
MUCOUS THREADS #/AREA URNS LPF: ABNORMAL /LPF
NEUTROPHILS # BLD: 2.7 K/UL (ref 1.7–7.7)
NEUTROPHILS NFR BLD: 80.5 %
NITRITE UR QL STRIP.AUTO: NEGATIVE
O2 CT VFR BLDV CALC: 20 VOL %
O2 THERAPY: ABNORMAL
OSMOLALITY SERPL: 284 MOSM/KG (ref 275–295)
PCO2 BLDV: 32 MMHG (ref 40–50)
PERFORMED ON: ABNORMAL
PH BLDV: 7.37 [PH] (ref 7.35–7.45)
PH BLDV: 7.38 [PH] (ref 7.35–7.45)
PH UR STRIP.AUTO: 6 [PH] (ref 5–8)
PHOSPHATE SERPL-MCNC: 2.1 MG/DL (ref 2.5–4.9)
PLATELET # BLD AUTO: 204 K/UL (ref 135–450)
PMV BLD AUTO: 8 FL (ref 5–10.5)
PO2 BLDV: 62.4 MMHG (ref 25–40)
POTASSIUM SERPL-SCNC: 3.5 MMOL/L (ref 3.5–5.1)
POTASSIUM SERPL-SCNC: 3.7 MMOL/L (ref 3.5–5.1)
POTASSIUM SERPL-SCNC: 4 MMOL/L (ref 3.5–5.1)
POTASSIUM SERPL-SCNC: 4.3 MMOL/L (ref 3.5–5.1)
POTASSIUM SERPL-SCNC: 4.3 MMOL/L (ref 3.5–5.1)
PROT UR STRIP.AUTO-MCNC: >=300 MG/DL
RBC # BLD AUTO: 4.3 M/UL (ref 4.2–5.9)
RBC #/AREA URNS HPF: ABNORMAL /HPF (ref 0–4)
SAO2 % BLDV: 92 %
SODIUM SERPL-SCNC: 126 MMOL/L (ref 136–145)
SODIUM SERPL-SCNC: 131 MMOL/L (ref 136–145)
SODIUM SERPL-SCNC: 132 MMOL/L (ref 136–145)
SODIUM SERPL-SCNC: 134 MMOL/L (ref 136–145)
SODIUM UR-SCNC: 26 MMOL/L
SP GR UR STRIP.AUTO: >=1.03 (ref 1–1.03)
UA DIPSTICK W REFLEX MICRO PNL UR: YES
URATE SERPL-MCNC: 5.7 MG/DL (ref 3.5–7.2)
URN SPEC COLLECT METH UR: ABNORMAL
UROBILINOGEN UR STRIP-ACNC: 0.2 E.U./DL
VANCOMYCIN TROUGH SERPL-MCNC: 10.3 UG/ML (ref 10–20)
WBC # BLD AUTO: 3.3 K/UL (ref 4–11)
WBC #/AREA URNS HPF: ABNORMAL /HPF (ref 0–5)

## 2025-02-24 PROCEDURE — 6360000002 HC RX W HCPCS: Performed by: INTERNAL MEDICINE

## 2025-02-24 PROCEDURE — 6370000000 HC RX 637 (ALT 250 FOR IP): Performed by: INTERNAL MEDICINE

## 2025-02-24 PROCEDURE — 87449 NOS EACH ORGANISM AG IA: CPT

## 2025-02-24 PROCEDURE — 6370000000 HC RX 637 (ALT 250 FOR IP): Performed by: STUDENT IN AN ORGANIZED HEALTH CARE EDUCATION/TRAINING PROGRAM

## 2025-02-24 PROCEDURE — 2500000003 HC RX 250 WO HCPCS: Performed by: INTERNAL MEDICINE

## 2025-02-24 PROCEDURE — 36415 COLL VENOUS BLD VENIPUNCTURE: CPT

## 2025-02-24 PROCEDURE — 2000000000 HC ICU R&B

## 2025-02-24 PROCEDURE — 94660 CPAP INITIATION&MGMT: CPT

## 2025-02-24 PROCEDURE — 6360000002 HC RX W HCPCS: Performed by: STUDENT IN AN ORGANIZED HEALTH CARE EDUCATION/TRAINING PROGRAM

## 2025-02-24 PROCEDURE — 71045 X-RAY EXAM CHEST 1 VIEW: CPT

## 2025-02-24 PROCEDURE — 99291 CRITICAL CARE FIRST HOUR: CPT | Performed by: INTERNAL MEDICINE

## 2025-02-24 PROCEDURE — 2580000003 HC RX 258: Performed by: INTERNAL MEDICINE

## 2025-02-24 PROCEDURE — 82330 ASSAY OF CALCIUM: CPT

## 2025-02-24 PROCEDURE — 85025 COMPLETE CBC W/AUTO DIFF WBC: CPT

## 2025-02-24 PROCEDURE — 87205 SMEAR GRAM STAIN: CPT

## 2025-02-24 PROCEDURE — 80069 RENAL FUNCTION PANEL: CPT

## 2025-02-24 PROCEDURE — 94640 AIRWAY INHALATION TREATMENT: CPT

## 2025-02-24 PROCEDURE — 5A0955A ASSISTANCE WITH RESPIRATORY VENTILATION, GREATER THAN 96 CONSECUTIVE HOURS, HIGH NASAL FLOW/VELOCITY: ICD-10-PCS | Performed by: STUDENT IN AN ORGANIZED HEALTH CARE EDUCATION/TRAINING PROGRAM

## 2025-02-24 PROCEDURE — 2500000003 HC RX 250 WO HCPCS: Performed by: STUDENT IN AN ORGANIZED HEALTH CARE EDUCATION/TRAINING PROGRAM

## 2025-02-24 PROCEDURE — 94761 N-INVAS EAR/PLS OXIMETRY MLT: CPT

## 2025-02-24 PROCEDURE — 82803 BLOOD GASES ANY COMBINATION: CPT

## 2025-02-24 PROCEDURE — 84300 ASSAY OF URINE SODIUM: CPT

## 2025-02-24 PROCEDURE — 2700000000 HC OXYGEN THERAPY PER DAY

## 2025-02-24 PROCEDURE — 80202 ASSAY OF VANCOMYCIN: CPT

## 2025-02-24 PROCEDURE — 83935 ASSAY OF URINE OSMOLALITY: CPT

## 2025-02-24 PROCEDURE — 84550 ASSAY OF BLOOD/URIC ACID: CPT

## 2025-02-24 PROCEDURE — 83735 ASSAY OF MAGNESIUM: CPT

## 2025-02-24 PROCEDURE — 87070 CULTURE OTHR SPECIMN AEROBIC: CPT

## 2025-02-24 PROCEDURE — 99233 SBSQ HOSP IP/OBS HIGH 50: CPT | Performed by: INTERNAL MEDICINE

## 2025-02-24 PROCEDURE — 81001 URINALYSIS AUTO W/SCOPE: CPT

## 2025-02-24 RX ORDER — IPRATROPIUM BROMIDE AND ALBUTEROL SULFATE 2.5; .5 MG/3ML; MG/3ML
1 SOLUTION RESPIRATORY (INHALATION)
Status: DISCONTINUED | OUTPATIENT
Start: 2025-02-24 | End: 2025-02-24

## 2025-02-24 RX ORDER — CALCIUM GLUCONATE 20 MG/ML
2000 INJECTION, SOLUTION INTRAVENOUS ONCE
Status: COMPLETED | OUTPATIENT
Start: 2025-02-24 | End: 2025-02-24

## 2025-02-24 RX ORDER — CLONIDINE HYDROCHLORIDE 0.1 MG/1
0.1 TABLET ORAL 2 TIMES DAILY
Status: DISCONTINUED | OUTPATIENT
Start: 2025-02-24 | End: 2025-02-25

## 2025-02-24 RX ORDER — CALCIUM GLUCONATE 94 MG/ML
2000 INJECTION, SOLUTION INTRAVENOUS ONCE
Status: DISCONTINUED | OUTPATIENT
Start: 2025-02-24 | End: 2025-02-24

## 2025-02-24 RX ORDER — ASPIRIN 81 MG/1
81 TABLET, CHEWABLE ORAL DAILY
Status: DISCONTINUED | OUTPATIENT
Start: 2025-02-24 | End: 2025-03-02 | Stop reason: HOSPADM

## 2025-02-24 RX ORDER — LOSARTAN POTASSIUM 100 MG/1
100 TABLET ORAL DAILY
Status: DISCONTINUED | OUTPATIENT
Start: 2025-02-24 | End: 2025-03-02 | Stop reason: HOSPADM

## 2025-02-24 RX ORDER — AMLODIPINE BESYLATE 5 MG/1
5 TABLET ORAL DAILY
Status: DISCONTINUED | OUTPATIENT
Start: 2025-02-24 | End: 2025-03-02 | Stop reason: HOSPADM

## 2025-02-24 RX ORDER — IPRATROPIUM BROMIDE AND ALBUTEROL SULFATE 2.5; .5 MG/3ML; MG/3ML
1 SOLUTION RESPIRATORY (INHALATION)
Status: DISCONTINUED | OUTPATIENT
Start: 2025-02-24 | End: 2025-03-02 | Stop reason: HOSPADM

## 2025-02-24 RX ORDER — DOXYCYCLINE HYCLATE 100 MG
100 TABLET ORAL EVERY 12 HOURS SCHEDULED
Status: COMPLETED | OUTPATIENT
Start: 2025-02-24 | End: 2025-02-28

## 2025-02-24 RX ORDER — IPRATROPIUM BROMIDE AND ALBUTEROL SULFATE 2.5; .5 MG/3ML; MG/3ML
1 SOLUTION RESPIRATORY (INHALATION) EVERY 4 HOURS PRN
Status: DISCONTINUED | OUTPATIENT
Start: 2025-02-24 | End: 2025-03-02 | Stop reason: HOSPADM

## 2025-02-24 RX ORDER — MUPIROCIN 20 MG/G
OINTMENT TOPICAL 2 TIMES DAILY
Status: DISPENSED | OUTPATIENT
Start: 2025-02-24 | End: 2025-03-01

## 2025-02-24 RX ADMIN — IPRATROPIUM BROMIDE AND ALBUTEROL SULFATE 1 DOSE: 2.5; .5 SOLUTION RESPIRATORY (INHALATION) at 07:42

## 2025-02-24 RX ADMIN — SODIUM CHLORIDE, PRESERVATIVE FREE 10 ML: 5 INJECTION INTRAVENOUS at 08:49

## 2025-02-24 RX ADMIN — POTASSIUM CHLORIDE: 2 INJECTION, SOLUTION, CONCENTRATE INTRAVENOUS at 00:00

## 2025-02-24 RX ADMIN — CEFEPIME 2000 MG: 2 INJECTION, POWDER, FOR SOLUTION INTRAVENOUS at 03:52

## 2025-02-24 RX ADMIN — OSELTAMIVIR PHOSPHATE 75 MG: 75 CAPSULE ORAL at 21:14

## 2025-02-24 RX ADMIN — DOXYCYCLINE HYCLATE 100 MG: 100 TABLET, COATED ORAL at 10:58

## 2025-02-24 RX ADMIN — Medication 5 MG: at 21:14

## 2025-02-24 RX ADMIN — POTASSIUM CHLORIDE 10 MEQ: 7.46 INJECTION, SOLUTION INTRAVENOUS at 01:51

## 2025-02-24 RX ADMIN — FLUOXETINE HYDROCHLORIDE 60 MG: 20 CAPSULE ORAL at 08:47

## 2025-02-24 RX ADMIN — THIAMINE HYDROCHLORIDE 100 MG: 100 INJECTION, SOLUTION INTRAMUSCULAR; INTRAVENOUS at 08:46

## 2025-02-24 RX ADMIN — OSELTAMIVIR PHOSPHATE 75 MG: 75 CAPSULE ORAL at 08:47

## 2025-02-24 RX ADMIN — SODIUM CHLORIDE 1000 MG: 900 INJECTION INTRAVENOUS at 11:01

## 2025-02-24 RX ADMIN — LOSARTAN POTASSIUM 100 MG: 100 TABLET, FILM COATED ORAL at 08:47

## 2025-02-24 RX ADMIN — MUPIROCIN: 20 OINTMENT TOPICAL at 08:58

## 2025-02-24 RX ADMIN — CLONIDINE HYDROCHLORIDE 0.1 MG: 0.1 TABLET ORAL at 08:47

## 2025-02-24 RX ADMIN — CLONIDINE HYDROCHLORIDE 0.1 MG: 0.1 TABLET ORAL at 21:14

## 2025-02-24 RX ADMIN — DOXYCYCLINE HYCLATE 100 MG: 100 TABLET, COATED ORAL at 21:14

## 2025-02-24 RX ADMIN — IPRATROPIUM BROMIDE AND ALBUTEROL SULFATE 1 DOSE: .5; 2.5 SOLUTION RESPIRATORY (INHALATION) at 15:59

## 2025-02-24 RX ADMIN — ENOXAPARIN SODIUM 30 MG: 100 INJECTION SUBCUTANEOUS at 08:47

## 2025-02-24 RX ADMIN — WATER 40 MG: 1 INJECTION INTRAMUSCULAR; INTRAVENOUS; SUBCUTANEOUS at 08:47

## 2025-02-24 RX ADMIN — ASPIRIN 81 MG: 81 TABLET, CHEWABLE ORAL at 08:47

## 2025-02-24 RX ADMIN — ENOXAPARIN SODIUM 30 MG: 100 INJECTION SUBCUTANEOUS at 21:14

## 2025-02-24 RX ADMIN — MUPIROCIN: 20 OINTMENT TOPICAL at 21:14

## 2025-02-24 RX ADMIN — IPRATROPIUM BROMIDE AND ALBUTEROL SULFATE 1 DOSE: .5; 2.5 SOLUTION RESPIRATORY (INHALATION) at 20:01

## 2025-02-24 RX ADMIN — POTASSIUM CHLORIDE 10 MEQ: 7.46 INJECTION, SOLUTION INTRAVENOUS at 00:49

## 2025-02-24 RX ADMIN — AMLODIPINE BESYLATE 5 MG: 5 TABLET ORAL at 08:47

## 2025-02-24 RX ADMIN — CALCIUM GLUCONATE 2000 MG: 20 INJECTION, SOLUTION INTRAVENOUS at 06:50

## 2025-02-24 RX ADMIN — SODIUM PHOSPHATE, MONOBASIC, MONOHYDRATE AND SODIUM PHOSPHATE, DIBASIC, ANHYDROUS 15 MMOL: 142; 276 INJECTION, SOLUTION INTRAVENOUS at 10:58

## 2025-02-24 NOTE — CONSULTS
Thank you to requesting provider: Dr. Luis , for asking us to see Juarez Acemaurisio  Reason for consultation:  Hyponatremia   Chief Complaint:  Shortness of breath    History of Presenting Illness      58 y/o male with history of ERNESTO admitted with the ICU for Bilevel after presenting to the ER with shortness of breath.  We have been asked to see him for hyponatremia as well as GRACIE and hypokalemia.  He is on bilevel and BP is stable.  Influenza A + and has a right lower lobe pneumonia.        Past Medical/Surgical History      Active Ambulatory Problems     Diagnosis Date Noted    Anxiety     Sleep apnea     Gout 02/16/2010    Hyperglycemia 07/07/2010    ED (erectile dysfunction) 08/08/2011    Testosterone deficiency 07/12/2012    GERD (gastroesophageal reflux disease) 11/27/2012    Lumbar disc disease with radiculopathy 05/04/2015    Benign essential HTN 09/04/2015    Chest pain 05/14/2022    Mixed hyperlipidemia     Hyponatremia      Resolved Ambulatory Problems     Diagnosis Date Noted    Back pain     HTN (hypertension) 04/05/2010     Past Medical History:   Diagnosis Date    Gout     Hypertension     Routine health maintenance          Review of Systems     Constitutional:  No weight loss, no fever/chills  Eyes:  No eye pain, no eye redness  Cardiovascular:  No chest pain, no worsening of edema  Respiratory:  No hemoptysis, no stridor  Gastrointestinal:  No blood in stool, no n/v, no diarrhea  Genitoruinary:  No hematuria, no difficulty with urination  Musculoskeletal:  No joint swelling, no redness  Integumentary:  No Rash, no itching  Neurological:  No focal weakness, No new sensory deficit  Psychiatric:  No depression, no confusion  Endocrine:  No polyuria, no polydipsia       Medications      Reviewed in EMR     Allergies     Patient has no known allergies.      Family History       Negative for Kidney Disease    Social History      Social History     Socioeconomic History    Marital status:

## 2025-02-24 NOTE — CONSULTS
Pharmacy to dose IV Vanco and Cefepime for Sepsis/ HCAP  Vanco 1,500mg given earlier today at 1630 to provide Gram+ organism coverage to include MRSA.  Will continue dosing at 1g IV Q12hrs  for now.  Check a Trough early in the course given SCr has improved since admission - trough tomorrow at 1100.  Pred AUC is 558, tr 18.3 if renal fxn were to remain stable.        Cefepime IV Q8hrs for sepsis/ HCAP xt days.  Rx will monitor and adjust if needed based on changes in renal function.  Latrell Barron RPH PharmD 2/23/2025 7:47 PM

## 2025-02-24 NOTE — CONSULTS
Pharmacy to dose IV Vanco and Cefepime for Sepsis/ HCAP    Cefepime 2g IV Q8hrs for sepsis/ HCAP x7 days.  Rx will monitor and adjust if needed based on changes in renal function.  Latrell Barron RPH PharmD 2/23/2025 7:47 PM

## 2025-02-24 NOTE — H&P
breath FINDINGS: Cardiac size is normal..  Interval development of a fairly diffuse airspace infiltrate primarily involving the mid and lower right lung zones.  Left lung is relatively clear...  No evidence of pneumothorax. No other significant changes are seen.     Interval development of right lung pneumonia.  Follow-up exam recommended.       Personally reviewed Lab Studies, Imaging    Electronically signed by Marcelo Haq MD on 2/23/2025 at 8:44 PM

## 2025-02-24 NOTE — CONSULTS
Pulmonary & Critical Care Consultation Note    Patient is being seen at the request of Jasmin Luis DO  for a consultation for respiratory failure on BiPAP    HISTORY OF PRESENT ILLNESS:   59 years old presented with cough and shortness of breath with flulike symptoms for 2 to 3 days.  + diarrhea. Found to be hypoxemic.  Placed on BiPAP for respiratory distress admitted to the ICU. No smoking. No IBD. No O2. No recent hospitalization or steroid use.  BIPAP 14/7 50% - did not do well on BiPAP this am on 15L     PAST MEDICAL HISTORY:  Past Medical History:   Diagnosis Date    Anxiety     Back pain     Gout     Hypertension     Routine health maintenance     echo 11/07,     Sleep apnea     mild, sleep study 11/07     PAST SURGICAL HISTORY:  Past Surgical History:   Procedure Laterality Date    COLONOSCOPY  11/16/2015    diverticulosis    ENDOSCOPY, COLON, DIAGNOSTIC  2015    EGD Gastritis       FAMILY HISTORY:  family history is not on file.    SOCIAL HISTORY:   reports that he has never smoked. His smokeless tobacco use includes snuff.    Scheduled Meds:   ipratropium 0.5 mg-albuterol 2.5 mg  1 Dose Inhalation Q4H WA RT    methylPREDNISolone  40 mg IntraVENous Daily    amLODIPine  5 mg Oral Daily    aspirin  81 mg Oral Daily    cloNIDine  0.1 mg Oral BID    FLUoxetine  60 mg Oral Daily    losartan  100 mg Oral Daily    calcium gluconate  2,000 mg IntraVENous Once    thiamine  100 mg IntraVENous Daily    oseltamivir  75 mg Oral BID    sodium chloride flush  5-40 mL IntraVENous 2 times per day    enoxaparin  30 mg SubCUTAneous BID    cefepime  2,000 mg IntraVENous Q8H    vancomycin  1,000 mg IntraVENous Q12H    melatonin  5 mg Oral Nightly     Continuous Infusions:   sodium chloride      potassium chloride 40 mEq, sodium bicarbonate 100 mEq in dextrose 5 % and 0.9 % NaCl 1,000 mL infusion 50 mL/hr at 02/24/25 0000     PRN Meds:  sodium chloride flush, sodium chloride, potassium chloride **OR** potassium alternative oral

## 2025-02-25 LAB
ANION GAP SERPL CALCULATED.3IONS-SCNC: 13 MMOL/L (ref 3–16)
BASOPHILS # BLD: 0 K/UL (ref 0–0.2)
BASOPHILS NFR BLD: 0.2 %
BUN SERPL-MCNC: 20 MG/DL (ref 7–20)
CALCIUM SERPL-MCNC: 7.5 MG/DL (ref 8.3–10.6)
CHLORIDE SERPL-SCNC: 104 MMOL/L (ref 99–110)
CO2 SERPL-SCNC: 17 MMOL/L (ref 21–32)
CREAT SERPL-MCNC: 0.9 MG/DL (ref 0.9–1.3)
DEPRECATED RDW RBC AUTO: 13.3 % (ref 12.4–15.4)
EOSINOPHIL # BLD: 0 K/UL (ref 0–0.6)
EOSINOPHIL NFR BLD: 0 %
GFR SERPLBLD CREATININE-BSD FMLA CKD-EPI: >90 ML/MIN/{1.73_M2}
GLUCOSE SERPL-MCNC: 151 MG/DL (ref 70–99)
HCT VFR BLD AUTO: 36.3 % (ref 40.5–52.5)
HGB BLD-MCNC: 12.3 G/DL (ref 13.5–17.5)
LEGIONELLA AG UR QL: NORMAL
LYMPHOCYTES # BLD: 0.5 K/UL (ref 1–5.1)
LYMPHOCYTES NFR BLD: 18.6 %
MCH RBC QN AUTO: 30.7 PG (ref 26–34)
MCHC RBC AUTO-ENTMCNC: 33.9 G/DL (ref 31–36)
MCV RBC AUTO: 90.6 FL (ref 80–100)
MONOCYTES # BLD: 0.3 K/UL (ref 0–1.3)
MONOCYTES NFR BLD: 11.8 %
MRSA DNA SPEC QL NAA+PROBE: NORMAL
NEUTROPHILS # BLD: 1.8 K/UL (ref 1.7–7.7)
NEUTROPHILS NFR BLD: 69.4 %
OSMOLALITY UR: 668 MOSM/KG (ref 390–1070)
PLATELET # BLD AUTO: 214 K/UL (ref 135–450)
PMV BLD AUTO: 8 FL (ref 5–10.5)
POTASSIUM SERPL-SCNC: 3.6 MMOL/L (ref 3.5–5.1)
RBC # BLD AUTO: 4.01 M/UL (ref 4.2–5.9)
S PNEUM AG UR QL: NORMAL
SODIUM SERPL-SCNC: 134 MMOL/L (ref 136–145)
WBC # BLD AUTO: 2.6 K/UL (ref 4–11)

## 2025-02-25 PROCEDURE — 85025 COMPLETE CBC W/AUTO DIFF WBC: CPT

## 2025-02-25 PROCEDURE — 6370000000 HC RX 637 (ALT 250 FOR IP): Performed by: STUDENT IN AN ORGANIZED HEALTH CARE EDUCATION/TRAINING PROGRAM

## 2025-02-25 PROCEDURE — 6360000002 HC RX W HCPCS: Performed by: INTERNAL MEDICINE

## 2025-02-25 PROCEDURE — 94660 CPAP INITIATION&MGMT: CPT

## 2025-02-25 PROCEDURE — 2580000003 HC RX 258: Performed by: INTERNAL MEDICINE

## 2025-02-25 PROCEDURE — 2700000000 HC OXYGEN THERAPY PER DAY

## 2025-02-25 PROCEDURE — 80048 BASIC METABOLIC PNL TOTAL CA: CPT

## 2025-02-25 PROCEDURE — 2000000000 HC ICU R&B

## 2025-02-25 PROCEDURE — 99291 CRITICAL CARE FIRST HOUR: CPT | Performed by: INTERNAL MEDICINE

## 2025-02-25 PROCEDURE — 2500000003 HC RX 250 WO HCPCS: Performed by: STUDENT IN AN ORGANIZED HEALTH CARE EDUCATION/TRAINING PROGRAM

## 2025-02-25 PROCEDURE — 94761 N-INVAS EAR/PLS OXIMETRY MLT: CPT

## 2025-02-25 PROCEDURE — 94640 AIRWAY INHALATION TREATMENT: CPT

## 2025-02-25 PROCEDURE — 6370000000 HC RX 637 (ALT 250 FOR IP): Performed by: INTERNAL MEDICINE

## 2025-02-25 PROCEDURE — 6370000000 HC RX 637 (ALT 250 FOR IP): Performed by: PEDIATRICS

## 2025-02-25 PROCEDURE — 99233 SBSQ HOSP IP/OBS HIGH 50: CPT | Performed by: INTERNAL MEDICINE

## 2025-02-25 PROCEDURE — 2500000003 HC RX 250 WO HCPCS: Performed by: INTERNAL MEDICINE

## 2025-02-25 PROCEDURE — 6360000002 HC RX W HCPCS: Performed by: STUDENT IN AN ORGANIZED HEALTH CARE EDUCATION/TRAINING PROGRAM

## 2025-02-25 PROCEDURE — 36415 COLL VENOUS BLD VENIPUNCTURE: CPT

## 2025-02-25 RX ORDER — LANOLIN ALCOHOL/MO/W.PET/CERES
100 CREAM (GRAM) TOPICAL DAILY
Status: DISCONTINUED | OUTPATIENT
Start: 2025-02-26 | End: 2025-03-02 | Stop reason: HOSPADM

## 2025-02-25 RX ORDER — PANTOPRAZOLE SODIUM 40 MG/1
40 TABLET, DELAYED RELEASE ORAL
Status: DISCONTINUED | OUTPATIENT
Start: 2025-02-25 | End: 2025-02-25

## 2025-02-25 RX ORDER — PANTOPRAZOLE SODIUM 40 MG/1
40 TABLET, DELAYED RELEASE ORAL
Status: DISCONTINUED | OUTPATIENT
Start: 2025-02-25 | End: 2025-03-02 | Stop reason: HOSPADM

## 2025-02-25 RX ORDER — OMEPRAZOLE 20 MG/1
40 CAPSULE, DELAYED RELEASE ORAL DAILY
COMMUNITY

## 2025-02-25 RX ORDER — CALCIUM CARBONATE 500 MG/1
1000 TABLET, CHEWABLE ORAL 3 TIMES DAILY PRN
Status: DISCONTINUED | OUTPATIENT
Start: 2025-02-25 | End: 2025-03-02 | Stop reason: HOSPADM

## 2025-02-25 RX ADMIN — MUPIROCIN: 20 OINTMENT TOPICAL at 09:05

## 2025-02-25 RX ADMIN — IPRATROPIUM BROMIDE AND ALBUTEROL SULFATE 1 DOSE: .5; 2.5 SOLUTION RESPIRATORY (INHALATION) at 14:04

## 2025-02-25 RX ADMIN — FLUOXETINE HYDROCHLORIDE 60 MG: 20 CAPSULE ORAL at 09:06

## 2025-02-25 RX ADMIN — OSELTAMIVIR PHOSPHATE 75 MG: 75 CAPSULE ORAL at 09:06

## 2025-02-25 RX ADMIN — LOSARTAN POTASSIUM 100 MG: 100 TABLET, FILM COATED ORAL at 09:10

## 2025-02-25 RX ADMIN — DOXYCYCLINE HYCLATE 100 MG: 100 TABLET, COATED ORAL at 21:55

## 2025-02-25 RX ADMIN — DOXYCYCLINE HYCLATE 100 MG: 100 TABLET, COATED ORAL at 09:06

## 2025-02-25 RX ADMIN — OSELTAMIVIR PHOSPHATE 75 MG: 75 CAPSULE ORAL at 21:55

## 2025-02-25 RX ADMIN — ENOXAPARIN SODIUM 30 MG: 100 INJECTION SUBCUTANEOUS at 21:54

## 2025-02-25 RX ADMIN — AMLODIPINE BESYLATE 5 MG: 5 TABLET ORAL at 09:10

## 2025-02-25 RX ADMIN — ANTACID TABLETS 1000 MG: 500 TABLET, CHEWABLE ORAL at 21:58

## 2025-02-25 RX ADMIN — PANTOPRAZOLE SODIUM 40 MG: 40 TABLET, DELAYED RELEASE ORAL at 21:55

## 2025-02-25 RX ADMIN — IPRATROPIUM BROMIDE AND ALBUTEROL SULFATE 1 DOSE: .5; 2.5 SOLUTION RESPIRATORY (INHALATION) at 08:10

## 2025-02-25 RX ADMIN — SODIUM CHLORIDE, PRESERVATIVE FREE 10 ML: 5 INJECTION INTRAVENOUS at 09:06

## 2025-02-25 RX ADMIN — ENOXAPARIN SODIUM 30 MG: 100 INJECTION SUBCUTANEOUS at 09:06

## 2025-02-25 RX ADMIN — ASPIRIN 81 MG: 81 TABLET, CHEWABLE ORAL at 09:06

## 2025-02-25 RX ADMIN — IPRATROPIUM BROMIDE AND ALBUTEROL SULFATE 1 DOSE: .5; 2.5 SOLUTION RESPIRATORY (INHALATION) at 20:20

## 2025-02-25 RX ADMIN — SODIUM CHLORIDE 1000 MG: 9 INJECTION, SOLUTION INTRAVENOUS at 12:19

## 2025-02-25 RX ADMIN — THIAMINE HYDROCHLORIDE 100 MG: 100 INJECTION, SOLUTION INTRAMUSCULAR; INTRAVENOUS at 09:05

## 2025-02-25 RX ADMIN — WATER 40 MG: 1 INJECTION INTRAMUSCULAR; INTRAVENOUS; SUBCUTANEOUS at 09:05

## 2025-02-25 RX ADMIN — Medication 5 MG: at 21:55

## 2025-02-26 LAB
ALBUMIN SERPL-MCNC: 2.7 G/DL (ref 3.4–5)
ANION GAP SERPL CALCULATED.3IONS-SCNC: 12 MMOL/L (ref 3–16)
BACTERIA SPEC RESP CULT: NORMAL
BASOPHILS # BLD: 0 K/UL (ref 0–0.2)
BASOPHILS NFR BLD: 0.1 %
BUN SERPL-MCNC: 21 MG/DL (ref 7–20)
CALCIUM SERPL-MCNC: 7.7 MG/DL (ref 8.3–10.6)
CHLORIDE SERPL-SCNC: 105 MMOL/L (ref 99–110)
CO2 SERPL-SCNC: 19 MMOL/L (ref 21–32)
CREAT SERPL-MCNC: 0.8 MG/DL (ref 0.9–1.3)
DEPRECATED RDW RBC AUTO: 13.2 % (ref 12.4–15.4)
EOSINOPHIL # BLD: 0 K/UL (ref 0–0.6)
EOSINOPHIL NFR BLD: 0 %
GFR SERPLBLD CREATININE-BSD FMLA CKD-EPI: >90 ML/MIN/{1.73_M2}
GLUCOSE SERPL-MCNC: 153 MG/DL (ref 70–99)
GRAM STN SPEC: NORMAL
HCT VFR BLD AUTO: 35 % (ref 40.5–52.5)
HGB BLD-MCNC: 12.3 G/DL (ref 13.5–17.5)
LYMPHOCYTES # BLD: 0.7 K/UL (ref 1–5.1)
LYMPHOCYTES NFR BLD: 18.1 %
MCH RBC QN AUTO: 31.3 PG (ref 26–34)
MCHC RBC AUTO-ENTMCNC: 35.2 G/DL (ref 31–36)
MCV RBC AUTO: 88.7 FL (ref 80–100)
MONOCYTES # BLD: 0.5 K/UL (ref 0–1.3)
MONOCYTES NFR BLD: 14.5 %
NEUTROPHILS # BLD: 2.5 K/UL (ref 1.7–7.7)
NEUTROPHILS NFR BLD: 67.3 %
PHOSPHATE SERPL-MCNC: 3.1 MG/DL (ref 2.5–4.9)
PLATELET # BLD AUTO: 264 K/UL (ref 135–450)
PMV BLD AUTO: 7.9 FL (ref 5–10.5)
POTASSIUM SERPL-SCNC: 3.5 MMOL/L (ref 3.5–5.1)
RBC # BLD AUTO: 3.95 M/UL (ref 4.2–5.9)
SODIUM SERPL-SCNC: 136 MMOL/L (ref 136–145)
WBC # BLD AUTO: 3.7 K/UL (ref 4–11)

## 2025-02-26 PROCEDURE — 94761 N-INVAS EAR/PLS OXIMETRY MLT: CPT

## 2025-02-26 PROCEDURE — 6370000000 HC RX 637 (ALT 250 FOR IP): Performed by: INTERNAL MEDICINE

## 2025-02-26 PROCEDURE — 94660 CPAP INITIATION&MGMT: CPT

## 2025-02-26 PROCEDURE — 94640 AIRWAY INHALATION TREATMENT: CPT

## 2025-02-26 PROCEDURE — 2000000000 HC ICU R&B

## 2025-02-26 PROCEDURE — 6370000000 HC RX 637 (ALT 250 FOR IP): Performed by: STUDENT IN AN ORGANIZED HEALTH CARE EDUCATION/TRAINING PROGRAM

## 2025-02-26 PROCEDURE — 80069 RENAL FUNCTION PANEL: CPT

## 2025-02-26 PROCEDURE — 99233 SBSQ HOSP IP/OBS HIGH 50: CPT | Performed by: INTERNAL MEDICINE

## 2025-02-26 PROCEDURE — 2580000003 HC RX 258: Performed by: INTERNAL MEDICINE

## 2025-02-26 PROCEDURE — 6360000002 HC RX W HCPCS: Performed by: INTERNAL MEDICINE

## 2025-02-26 PROCEDURE — 36415 COLL VENOUS BLD VENIPUNCTURE: CPT

## 2025-02-26 PROCEDURE — 2500000003 HC RX 250 WO HCPCS: Performed by: STUDENT IN AN ORGANIZED HEALTH CARE EDUCATION/TRAINING PROGRAM

## 2025-02-26 PROCEDURE — 6370000000 HC RX 637 (ALT 250 FOR IP): Performed by: PEDIATRICS

## 2025-02-26 PROCEDURE — 2700000000 HC OXYGEN THERAPY PER DAY

## 2025-02-26 PROCEDURE — 85025 COMPLETE CBC W/AUTO DIFF WBC: CPT

## 2025-02-26 PROCEDURE — 6360000002 HC RX W HCPCS: Performed by: STUDENT IN AN ORGANIZED HEALTH CARE EDUCATION/TRAINING PROGRAM

## 2025-02-26 PROCEDURE — 2500000003 HC RX 250 WO HCPCS: Performed by: INTERNAL MEDICINE

## 2025-02-26 RX ORDER — LORAZEPAM 2 MG/ML
0.5 INJECTION INTRAMUSCULAR EVERY 6 HOURS PRN
Status: DISCONTINUED | OUTPATIENT
Start: 2025-02-26 | End: 2025-03-02 | Stop reason: HOSPADM

## 2025-02-26 RX ADMIN — OSELTAMIVIR PHOSPHATE 75 MG: 75 CAPSULE ORAL at 08:43

## 2025-02-26 RX ADMIN — ENOXAPARIN SODIUM 30 MG: 100 INJECTION SUBCUTANEOUS at 08:44

## 2025-02-26 RX ADMIN — SODIUM CHLORIDE, PRESERVATIVE FREE 10 ML: 5 INJECTION INTRAVENOUS at 08:44

## 2025-02-26 RX ADMIN — PANTOPRAZOLE SODIUM 40 MG: 40 TABLET, DELAYED RELEASE ORAL at 08:43

## 2025-02-26 RX ADMIN — MUPIROCIN: 20 OINTMENT TOPICAL at 08:44

## 2025-02-26 RX ADMIN — ASPIRIN 81 MG: 81 TABLET, CHEWABLE ORAL at 08:43

## 2025-02-26 RX ADMIN — OSELTAMIVIR PHOSPHATE 75 MG: 75 CAPSULE ORAL at 21:16

## 2025-02-26 RX ADMIN — Medication 100 MG: at 08:43

## 2025-02-26 RX ADMIN — WATER 40 MG: 1 INJECTION INTRAMUSCULAR; INTRAVENOUS; SUBCUTANEOUS at 08:43

## 2025-02-26 RX ADMIN — ACETAMINOPHEN 650 MG: 325 TABLET ORAL at 14:32

## 2025-02-26 RX ADMIN — DOXYCYCLINE HYCLATE 100 MG: 100 TABLET, COATED ORAL at 21:16

## 2025-02-26 RX ADMIN — IPRATROPIUM BROMIDE AND ALBUTEROL SULFATE 1 DOSE: .5; 2.5 SOLUTION RESPIRATORY (INHALATION) at 13:56

## 2025-02-26 RX ADMIN — AMLODIPINE BESYLATE 5 MG: 5 TABLET ORAL at 08:43

## 2025-02-26 RX ADMIN — LOSARTAN POTASSIUM 100 MG: 100 TABLET, FILM COATED ORAL at 08:43

## 2025-02-26 RX ADMIN — SODIUM CHLORIDE, PRESERVATIVE FREE 10 ML: 5 INJECTION INTRAVENOUS at 21:19

## 2025-02-26 RX ADMIN — SODIUM CHLORIDE 1000 MG: 9 INJECTION, SOLUTION INTRAVENOUS at 12:12

## 2025-02-26 RX ADMIN — LORAZEPAM 0.5 MG: 2 INJECTION INTRAMUSCULAR; INTRAVENOUS at 14:41

## 2025-02-26 RX ADMIN — IPRATROPIUM BROMIDE AND ALBUTEROL SULFATE 1 DOSE: .5; 2.5 SOLUTION RESPIRATORY (INHALATION) at 08:08

## 2025-02-26 RX ADMIN — ENOXAPARIN SODIUM 30 MG: 100 INJECTION SUBCUTANEOUS at 21:16

## 2025-02-26 RX ADMIN — DOXYCYCLINE HYCLATE 100 MG: 100 TABLET, COATED ORAL at 08:43

## 2025-02-26 RX ADMIN — Medication 5 MG: at 21:16

## 2025-02-26 RX ADMIN — FLUOXETINE HYDROCHLORIDE 60 MG: 20 CAPSULE ORAL at 08:43

## 2025-02-26 RX ADMIN — MUPIROCIN: 20 OINTMENT TOPICAL at 21:17

## 2025-02-26 RX ADMIN — IPRATROPIUM BROMIDE AND ALBUTEROL SULFATE 1 DOSE: .5; 2.5 SOLUTION RESPIRATORY (INHALATION) at 19:15

## 2025-02-26 ASSESSMENT — PAIN DESCRIPTION - DESCRIPTORS: DESCRIPTORS: THROBBING

## 2025-02-26 ASSESSMENT — PAIN DESCRIPTION - ORIENTATION: ORIENTATION: LEFT

## 2025-02-26 ASSESSMENT — PAIN SCALES - GENERAL: PAINLEVEL_OUTOF10: 3

## 2025-02-26 ASSESSMENT — PAIN DESCRIPTION - LOCATION: LOCATION: HEAD

## 2025-02-27 LAB
ANION GAP SERPL CALCULATED.3IONS-SCNC: 15 MMOL/L (ref 3–16)
BACTERIA BLD CULT ORG #2: NORMAL
BACTERIA BLD CULT: NORMAL
BASOPHILS # BLD: 0 K/UL (ref 0–0.2)
BASOPHILS NFR BLD: 0 %
BUN SERPL-MCNC: 21 MG/DL (ref 7–20)
CALCIUM SERPL-MCNC: 8 MG/DL (ref 8.3–10.6)
CHLORIDE SERPL-SCNC: 105 MMOL/L (ref 99–110)
CO2 SERPL-SCNC: 17 MMOL/L (ref 21–32)
CREAT SERPL-MCNC: 0.8 MG/DL (ref 0.9–1.3)
DEPRECATED RDW RBC AUTO: 13.6 % (ref 12.4–15.4)
EOSINOPHIL # BLD: 0 K/UL (ref 0–0.6)
EOSINOPHIL NFR BLD: 0 %
GFR SERPLBLD CREATININE-BSD FMLA CKD-EPI: >90 ML/MIN/{1.73_M2}
GLUCOSE SERPL-MCNC: 152 MG/DL (ref 70–99)
HCT VFR BLD AUTO: 40.1 % (ref 40.5–52.5)
HGB BLD-MCNC: 13.6 G/DL (ref 13.5–17.5)
LYMPHOCYTES # BLD: 0.6 K/UL (ref 1–5.1)
LYMPHOCYTES NFR BLD: 6.2 %
MCH RBC QN AUTO: 31 PG (ref 26–34)
MCHC RBC AUTO-ENTMCNC: 33.8 G/DL (ref 31–36)
MCV RBC AUTO: 92 FL (ref 80–100)
MONOCYTES # BLD: 1 K/UL (ref 0–1.3)
MONOCYTES NFR BLD: 9.7 %
NEUTROPHILS # BLD: 8.4 K/UL (ref 1.7–7.7)
NEUTROPHILS NFR BLD: 84.1 %
PLATELET # BLD AUTO: 325 K/UL (ref 135–450)
PMV BLD AUTO: 7.8 FL (ref 5–10.5)
POTASSIUM SERPL-SCNC: 3.9 MMOL/L (ref 3.5–5.1)
RBC # BLD AUTO: 4.36 M/UL (ref 4.2–5.9)
SODIUM SERPL-SCNC: 137 MMOL/L (ref 136–145)
WBC # BLD AUTO: 10 K/UL (ref 4–11)

## 2025-02-27 PROCEDURE — 2500000003 HC RX 250 WO HCPCS: Performed by: STUDENT IN AN ORGANIZED HEALTH CARE EDUCATION/TRAINING PROGRAM

## 2025-02-27 PROCEDURE — 2700000000 HC OXYGEN THERAPY PER DAY

## 2025-02-27 PROCEDURE — 94640 AIRWAY INHALATION TREATMENT: CPT

## 2025-02-27 PROCEDURE — 2580000003 HC RX 258: Performed by: INTERNAL MEDICINE

## 2025-02-27 PROCEDURE — 6370000000 HC RX 637 (ALT 250 FOR IP): Performed by: PEDIATRICS

## 2025-02-27 PROCEDURE — 99233 SBSQ HOSP IP/OBS HIGH 50: CPT | Performed by: INTERNAL MEDICINE

## 2025-02-27 PROCEDURE — 94660 CPAP INITIATION&MGMT: CPT

## 2025-02-27 PROCEDURE — 6370000000 HC RX 637 (ALT 250 FOR IP): Performed by: INTERNAL MEDICINE

## 2025-02-27 PROCEDURE — 6360000002 HC RX W HCPCS: Performed by: INTERNAL MEDICINE

## 2025-02-27 PROCEDURE — 80048 BASIC METABOLIC PNL TOTAL CA: CPT

## 2025-02-27 PROCEDURE — 36415 COLL VENOUS BLD VENIPUNCTURE: CPT

## 2025-02-27 PROCEDURE — 94761 N-INVAS EAR/PLS OXIMETRY MLT: CPT

## 2025-02-27 PROCEDURE — 6370000000 HC RX 637 (ALT 250 FOR IP): Performed by: STUDENT IN AN ORGANIZED HEALTH CARE EDUCATION/TRAINING PROGRAM

## 2025-02-27 PROCEDURE — 2500000003 HC RX 250 WO HCPCS: Performed by: INTERNAL MEDICINE

## 2025-02-27 PROCEDURE — 85025 COMPLETE CBC W/AUTO DIFF WBC: CPT

## 2025-02-27 PROCEDURE — 6360000002 HC RX W HCPCS: Performed by: STUDENT IN AN ORGANIZED HEALTH CARE EDUCATION/TRAINING PROGRAM

## 2025-02-27 PROCEDURE — 2000000000 HC ICU R&B

## 2025-02-27 RX ADMIN — LOSARTAN POTASSIUM 100 MG: 100 TABLET, FILM COATED ORAL at 07:51

## 2025-02-27 RX ADMIN — WATER 40 MG: 1 INJECTION INTRAMUSCULAR; INTRAVENOUS; SUBCUTANEOUS at 07:51

## 2025-02-27 RX ADMIN — DOXYCYCLINE HYCLATE 100 MG: 100 TABLET, COATED ORAL at 20:30

## 2025-02-27 RX ADMIN — SODIUM CHLORIDE 1000 MG: 9 INJECTION, SOLUTION INTRAVENOUS at 11:31

## 2025-02-27 RX ADMIN — AMLODIPINE BESYLATE 5 MG: 5 TABLET ORAL at 07:51

## 2025-02-27 RX ADMIN — IPRATROPIUM BROMIDE AND ALBUTEROL SULFATE 1 DOSE: .5; 2.5 SOLUTION RESPIRATORY (INHALATION) at 06:55

## 2025-02-27 RX ADMIN — PANTOPRAZOLE SODIUM 40 MG: 40 TABLET, DELAYED RELEASE ORAL at 07:51

## 2025-02-27 RX ADMIN — ENOXAPARIN SODIUM 30 MG: 100 INJECTION SUBCUTANEOUS at 20:30

## 2025-02-27 RX ADMIN — OSELTAMIVIR PHOSPHATE 75 MG: 75 CAPSULE ORAL at 07:51

## 2025-02-27 RX ADMIN — MUPIROCIN: 20 OINTMENT TOPICAL at 20:31

## 2025-02-27 RX ADMIN — SODIUM CHLORIDE, PRESERVATIVE FREE 10 ML: 5 INJECTION INTRAVENOUS at 07:51

## 2025-02-27 RX ADMIN — SODIUM CHLORIDE, PRESERVATIVE FREE 10 ML: 5 INJECTION INTRAVENOUS at 20:31

## 2025-02-27 RX ADMIN — IPRATROPIUM BROMIDE AND ALBUTEROL SULFATE 1 DOSE: .5; 2.5 SOLUTION RESPIRATORY (INHALATION) at 13:13

## 2025-02-27 RX ADMIN — OSELTAMIVIR PHOSPHATE 75 MG: 75 CAPSULE ORAL at 20:30

## 2025-02-27 RX ADMIN — ASPIRIN 81 MG: 81 TABLET, CHEWABLE ORAL at 07:51

## 2025-02-27 RX ADMIN — MUPIROCIN: 20 OINTMENT TOPICAL at 07:52

## 2025-02-27 RX ADMIN — DOXYCYCLINE HYCLATE 100 MG: 100 TABLET, COATED ORAL at 07:50

## 2025-02-27 RX ADMIN — Medication 5 MG: at 20:30

## 2025-02-27 RX ADMIN — ENOXAPARIN SODIUM 30 MG: 100 INJECTION SUBCUTANEOUS at 07:51

## 2025-02-27 RX ADMIN — IPRATROPIUM BROMIDE AND ALBUTEROL SULFATE 1 DOSE: .5; 2.5 SOLUTION RESPIRATORY (INHALATION) at 19:49

## 2025-02-27 RX ADMIN — FLUOXETINE HYDROCHLORIDE 60 MG: 20 CAPSULE ORAL at 07:50

## 2025-02-27 RX ADMIN — Medication 100 MG: at 07:50

## 2025-02-28 LAB
ANION GAP SERPL CALCULATED.3IONS-SCNC: 12 MMOL/L (ref 3–16)
BASOPHILS # BLD: 0 K/UL (ref 0–0.2)
BASOPHILS NFR BLD: 0 %
BUN SERPL-MCNC: 20 MG/DL (ref 7–20)
CALCIUM SERPL-MCNC: 7.9 MG/DL (ref 8.3–10.6)
CHLORIDE SERPL-SCNC: 104 MMOL/L (ref 99–110)
CO2 SERPL-SCNC: 20 MMOL/L (ref 21–32)
CREAT SERPL-MCNC: 0.8 MG/DL (ref 0.9–1.3)
DEPRECATED RDW RBC AUTO: 12.8 % (ref 12.4–15.4)
EOSINOPHIL # BLD: 0 K/UL (ref 0–0.6)
EOSINOPHIL NFR BLD: 0.1 %
GFR SERPLBLD CREATININE-BSD FMLA CKD-EPI: >90 ML/MIN/{1.73_M2}
GLUCOSE SERPL-MCNC: 129 MG/DL (ref 70–99)
HCT VFR BLD AUTO: 37 % (ref 40.5–52.5)
HGB BLD-MCNC: 12.9 G/DL (ref 13.5–17.5)
LYMPHOCYTES # BLD: 1.4 K/UL (ref 1–5.1)
LYMPHOCYTES NFR BLD: 15.8 %
MCH RBC QN AUTO: 31.3 PG (ref 26–34)
MCHC RBC AUTO-ENTMCNC: 35 G/DL (ref 31–36)
MCV RBC AUTO: 89.3 FL (ref 80–100)
MONOCYTES # BLD: 1 K/UL (ref 0–1.3)
MONOCYTES NFR BLD: 11.2 %
NEUTROPHILS # BLD: 6.2 K/UL (ref 1.7–7.7)
NEUTROPHILS NFR BLD: 72.9 %
PLATELET # BLD AUTO: 339 K/UL (ref 135–450)
PMV BLD AUTO: 8.1 FL (ref 5–10.5)
POTASSIUM SERPL-SCNC: 3.7 MMOL/L (ref 3.5–5.1)
RBC # BLD AUTO: 4.14 M/UL (ref 4.2–5.9)
REASON FOR REJECTION: NORMAL
REJECTED TEST: NORMAL
SODIUM SERPL-SCNC: 136 MMOL/L (ref 136–145)
WBC # BLD AUTO: 8.6 K/UL (ref 4–11)

## 2025-02-28 PROCEDURE — 84132 ASSAY OF SERUM POTASSIUM: CPT

## 2025-02-28 PROCEDURE — 6370000000 HC RX 637 (ALT 250 FOR IP): Performed by: INTERNAL MEDICINE

## 2025-02-28 PROCEDURE — 99233 SBSQ HOSP IP/OBS HIGH 50: CPT | Performed by: INTERNAL MEDICINE

## 2025-02-28 PROCEDURE — 94640 AIRWAY INHALATION TREATMENT: CPT

## 2025-02-28 PROCEDURE — 6370000000 HC RX 637 (ALT 250 FOR IP): Performed by: PEDIATRICS

## 2025-02-28 PROCEDURE — 80048 BASIC METABOLIC PNL TOTAL CA: CPT

## 2025-02-28 PROCEDURE — 94660 CPAP INITIATION&MGMT: CPT

## 2025-02-28 PROCEDURE — 6370000000 HC RX 637 (ALT 250 FOR IP): Performed by: STUDENT IN AN ORGANIZED HEALTH CARE EDUCATION/TRAINING PROGRAM

## 2025-02-28 PROCEDURE — 36415 COLL VENOUS BLD VENIPUNCTURE: CPT

## 2025-02-28 PROCEDURE — 6360000002 HC RX W HCPCS: Performed by: INTERNAL MEDICINE

## 2025-02-28 PROCEDURE — 2500000003 HC RX 250 WO HCPCS: Performed by: INTERNAL MEDICINE

## 2025-02-28 PROCEDURE — 2580000003 HC RX 258: Performed by: INTERNAL MEDICINE

## 2025-02-28 PROCEDURE — 2500000003 HC RX 250 WO HCPCS: Performed by: STUDENT IN AN ORGANIZED HEALTH CARE EDUCATION/TRAINING PROGRAM

## 2025-02-28 PROCEDURE — 85025 COMPLETE CBC W/AUTO DIFF WBC: CPT

## 2025-02-28 PROCEDURE — 94761 N-INVAS EAR/PLS OXIMETRY MLT: CPT

## 2025-02-28 PROCEDURE — 6360000002 HC RX W HCPCS: Performed by: STUDENT IN AN ORGANIZED HEALTH CARE EDUCATION/TRAINING PROGRAM

## 2025-02-28 PROCEDURE — 2700000000 HC OXYGEN THERAPY PER DAY

## 2025-02-28 PROCEDURE — 1200000000 HC SEMI PRIVATE

## 2025-02-28 RX ADMIN — OSELTAMIVIR PHOSPHATE 75 MG: 75 CAPSULE ORAL at 08:54

## 2025-02-28 RX ADMIN — LOSARTAN POTASSIUM 100 MG: 100 TABLET, FILM COATED ORAL at 08:47

## 2025-02-28 RX ADMIN — SODIUM CHLORIDE, PRESERVATIVE FREE 10 ML: 5 INJECTION INTRAVENOUS at 08:47

## 2025-02-28 RX ADMIN — DOXYCYCLINE HYCLATE 100 MG: 100 TABLET, COATED ORAL at 08:47

## 2025-02-28 RX ADMIN — IPRATROPIUM BROMIDE AND ALBUTEROL SULFATE 1 DOSE: .5; 2.5 SOLUTION RESPIRATORY (INHALATION) at 13:14

## 2025-02-28 RX ADMIN — ENOXAPARIN SODIUM 30 MG: 100 INJECTION SUBCUTANEOUS at 08:47

## 2025-02-28 RX ADMIN — FLUOXETINE HYDROCHLORIDE 60 MG: 20 CAPSULE ORAL at 08:47

## 2025-02-28 RX ADMIN — PANTOPRAZOLE SODIUM 40 MG: 40 TABLET, DELAYED RELEASE ORAL at 06:13

## 2025-02-28 RX ADMIN — IPRATROPIUM BROMIDE AND ALBUTEROL SULFATE 1 DOSE: .5; 2.5 SOLUTION RESPIRATORY (INHALATION) at 07:01

## 2025-02-28 RX ADMIN — ASPIRIN 81 MG: 81 TABLET, CHEWABLE ORAL at 08:47

## 2025-02-28 RX ADMIN — MUPIROCIN: 20 OINTMENT TOPICAL at 08:47

## 2025-02-28 RX ADMIN — ENOXAPARIN SODIUM 30 MG: 100 INJECTION SUBCUTANEOUS at 19:48

## 2025-02-28 RX ADMIN — DOXYCYCLINE HYCLATE 100 MG: 100 TABLET, COATED ORAL at 19:48

## 2025-02-28 RX ADMIN — AMLODIPINE BESYLATE 5 MG: 5 TABLET ORAL at 08:47

## 2025-02-28 RX ADMIN — SODIUM CHLORIDE, PRESERVATIVE FREE 10 ML: 5 INJECTION INTRAVENOUS at 19:48

## 2025-02-28 RX ADMIN — SODIUM CHLORIDE 1000 MG: 9 INJECTION, SOLUTION INTRAVENOUS at 12:29

## 2025-02-28 RX ADMIN — MUPIROCIN: 20 OINTMENT TOPICAL at 19:48

## 2025-02-28 RX ADMIN — Medication 5 MG: at 19:48

## 2025-02-28 RX ADMIN — WATER 40 MG: 1 INJECTION INTRAMUSCULAR; INTRAVENOUS; SUBCUTANEOUS at 08:47

## 2025-02-28 RX ADMIN — IPRATROPIUM BROMIDE AND ALBUTEROL SULFATE 1 DOSE: .5; 2.5 SOLUTION RESPIRATORY (INHALATION) at 19:21

## 2025-02-28 RX ADMIN — Medication 100 MG: at 08:47

## 2025-03-01 LAB
ANION GAP SERPL CALCULATED.3IONS-SCNC: 11 MMOL/L (ref 3–16)
BASOPHILS # BLD: 0 K/UL (ref 0–0.2)
BASOPHILS NFR BLD: 0.1 %
BUN SERPL-MCNC: 20 MG/DL (ref 7–20)
CALCIUM SERPL-MCNC: 8.1 MG/DL (ref 8.3–10.6)
CHLORIDE SERPL-SCNC: 105 MMOL/L (ref 99–110)
CO2 SERPL-SCNC: 21 MMOL/L (ref 21–32)
CREAT SERPL-MCNC: 0.7 MG/DL (ref 0.9–1.3)
DEPRECATED RDW RBC AUTO: 13.1 % (ref 12.4–15.4)
EOSINOPHIL # BLD: 0 K/UL (ref 0–0.6)
EOSINOPHIL NFR BLD: 0.2 %
GFR SERPLBLD CREATININE-BSD FMLA CKD-EPI: >90 ML/MIN/{1.73_M2}
GLUCOSE SERPL-MCNC: 150 MG/DL (ref 70–99)
HCT VFR BLD AUTO: 36.4 % (ref 40.5–52.5)
HGB BLD-MCNC: 12.7 G/DL (ref 13.5–17.5)
LYMPHOCYTES # BLD: 1 K/UL (ref 1–5.1)
LYMPHOCYTES NFR BLD: 10.9 %
MCH RBC QN AUTO: 31.2 PG (ref 26–34)
MCHC RBC AUTO-ENTMCNC: 34.9 G/DL (ref 31–36)
MCV RBC AUTO: 89.4 FL (ref 80–100)
MONOCYTES # BLD: 0.9 K/UL (ref 0–1.3)
MONOCYTES NFR BLD: 9.8 %
NEUTROPHILS # BLD: 7.5 K/UL (ref 1.7–7.7)
NEUTROPHILS NFR BLD: 79 %
PLATELET # BLD AUTO: 348 K/UL (ref 135–450)
PMV BLD AUTO: 8 FL (ref 5–10.5)
POTASSIUM SERPL-SCNC: 3.9 MMOL/L (ref 3.5–5.1)
RBC # BLD AUTO: 4.07 M/UL (ref 4.2–5.9)
SODIUM SERPL-SCNC: 137 MMOL/L (ref 136–145)
WBC # BLD AUTO: 9.5 K/UL (ref 4–11)

## 2025-03-01 PROCEDURE — 6370000000 HC RX 637 (ALT 250 FOR IP): Performed by: INTERNAL MEDICINE

## 2025-03-01 PROCEDURE — 94660 CPAP INITIATION&MGMT: CPT

## 2025-03-01 PROCEDURE — 80048 BASIC METABOLIC PNL TOTAL CA: CPT

## 2025-03-01 PROCEDURE — 1200000000 HC SEMI PRIVATE

## 2025-03-01 PROCEDURE — 6360000002 HC RX W HCPCS: Performed by: INTERNAL MEDICINE

## 2025-03-01 PROCEDURE — 85025 COMPLETE CBC W/AUTO DIFF WBC: CPT

## 2025-03-01 PROCEDURE — 2500000003 HC RX 250 WO HCPCS: Performed by: INTERNAL MEDICINE

## 2025-03-01 PROCEDURE — 94761 N-INVAS EAR/PLS OXIMETRY MLT: CPT

## 2025-03-01 PROCEDURE — 94640 AIRWAY INHALATION TREATMENT: CPT

## 2025-03-01 PROCEDURE — 99233 SBSQ HOSP IP/OBS HIGH 50: CPT | Performed by: INTERNAL MEDICINE

## 2025-03-01 PROCEDURE — 36415 COLL VENOUS BLD VENIPUNCTURE: CPT

## 2025-03-01 PROCEDURE — 2700000000 HC OXYGEN THERAPY PER DAY

## 2025-03-01 PROCEDURE — 99232 SBSQ HOSP IP/OBS MODERATE 35: CPT | Performed by: INTERNAL MEDICINE

## 2025-03-01 RX ADMIN — Medication 5 MG: at 21:33

## 2025-03-01 RX ADMIN — AMLODIPINE BESYLATE 5 MG: 5 TABLET ORAL at 08:45

## 2025-03-01 RX ADMIN — SODIUM CHLORIDE, PRESERVATIVE FREE 10 ML: 5 INJECTION INTRAVENOUS at 08:45

## 2025-03-01 RX ADMIN — IPRATROPIUM BROMIDE AND ALBUTEROL SULFATE 1 DOSE: .5; 2.5 SOLUTION RESPIRATORY (INHALATION) at 08:30

## 2025-03-01 RX ADMIN — FLUOXETINE HYDROCHLORIDE 60 MG: 20 CAPSULE ORAL at 08:45

## 2025-03-01 RX ADMIN — ASPIRIN 81 MG: 81 TABLET, CHEWABLE ORAL at 08:45

## 2025-03-01 RX ADMIN — PREDNISONE 30 MG: 20 TABLET ORAL at 08:45

## 2025-03-01 RX ADMIN — ENOXAPARIN SODIUM 30 MG: 100 INJECTION SUBCUTANEOUS at 08:45

## 2025-03-01 RX ADMIN — IPRATROPIUM BROMIDE AND ALBUTEROL SULFATE 1 DOSE: .5; 2.5 SOLUTION RESPIRATORY (INHALATION) at 15:39

## 2025-03-01 RX ADMIN — ENOXAPARIN SODIUM 30 MG: 100 INJECTION SUBCUTANEOUS at 21:33

## 2025-03-01 RX ADMIN — SODIUM CHLORIDE, PRESERVATIVE FREE 10 ML: 5 INJECTION INTRAVENOUS at 21:33

## 2025-03-01 RX ADMIN — PANTOPRAZOLE SODIUM 40 MG: 40 TABLET, DELAYED RELEASE ORAL at 08:45

## 2025-03-01 RX ADMIN — Medication 100 MG: at 08:45

## 2025-03-01 RX ADMIN — LOSARTAN POTASSIUM 100 MG: 100 TABLET, FILM COATED ORAL at 08:45

## 2025-03-01 RX ADMIN — ACETAMINOPHEN 650 MG: 325 TABLET ORAL at 08:48

## 2025-03-01 RX ADMIN — IPRATROPIUM BROMIDE AND ALBUTEROL SULFATE 1 DOSE: .5; 2.5 SOLUTION RESPIRATORY (INHALATION) at 18:57

## 2025-03-01 ASSESSMENT — PAIN DESCRIPTION - ORIENTATION: ORIENTATION: MID

## 2025-03-01 ASSESSMENT — PAIN DESCRIPTION - LOCATION: LOCATION: HEAD

## 2025-03-01 ASSESSMENT — PAIN DESCRIPTION - DESCRIPTORS: DESCRIPTORS: ACHING

## 2025-03-01 ASSESSMENT — PAIN SCALES - GENERAL: PAINLEVEL_OUTOF10: 3

## 2025-03-02 VITALS
BODY MASS INDEX: 30.49 KG/M2 | WEIGHT: 225.09 LBS | HEIGHT: 72 IN | HEART RATE: 98 BPM | TEMPERATURE: 97.3 F | SYSTOLIC BLOOD PRESSURE: 153 MMHG | DIASTOLIC BLOOD PRESSURE: 73 MMHG | RESPIRATION RATE: 19 BRPM | OXYGEN SATURATION: 95 %

## 2025-03-02 LAB
ANION GAP SERPL CALCULATED.3IONS-SCNC: 11 MMOL/L (ref 3–16)
BASOPHILS # BLD: 0 K/UL (ref 0–0.2)
BASOPHILS NFR BLD: 0 %
BUN SERPL-MCNC: 18 MG/DL (ref 7–20)
CALCIUM SERPL-MCNC: 7.9 MG/DL (ref 8.3–10.6)
CHLORIDE SERPL-SCNC: 102 MMOL/L (ref 99–110)
CO2 SERPL-SCNC: 22 MMOL/L (ref 21–32)
CREAT SERPL-MCNC: 0.8 MG/DL (ref 0.9–1.3)
DEPRECATED RDW RBC AUTO: 13.3 % (ref 12.4–15.4)
EOSINOPHIL # BLD: 0.2 K/UL (ref 0–0.6)
EOSINOPHIL NFR BLD: 1.3 %
GFR SERPLBLD CREATININE-BSD FMLA CKD-EPI: >90 ML/MIN/{1.73_M2}
GLUCOSE SERPL-MCNC: 121 MG/DL (ref 70–99)
HCT VFR BLD AUTO: 37.6 % (ref 40.5–52.5)
HGB BLD-MCNC: 12.7 G/DL (ref 13.5–17.5)
LYMPHOCYTES # BLD: 1.1 K/UL (ref 1–5.1)
LYMPHOCYTES NFR BLD: 9.9 %
MCH RBC QN AUTO: 30.6 PG (ref 26–34)
MCHC RBC AUTO-ENTMCNC: 33.9 G/DL (ref 31–36)
MCV RBC AUTO: 90.2 FL (ref 80–100)
MONOCYTES # BLD: 1.1 K/UL (ref 0–1.3)
MONOCYTES NFR BLD: 10 %
NEUTROPHILS # BLD: 9 K/UL (ref 1.7–7.7)
NEUTROPHILS NFR BLD: 78.8 %
PLATELET # BLD AUTO: 421 K/UL (ref 135–450)
PMV BLD AUTO: 7.9 FL (ref 5–10.5)
POTASSIUM SERPL-SCNC: 3.9 MMOL/L (ref 3.5–5.1)
RBC # BLD AUTO: 4.17 M/UL (ref 4.2–5.9)
SODIUM SERPL-SCNC: 135 MMOL/L (ref 136–145)
WBC # BLD AUTO: 11.4 K/UL (ref 4–11)

## 2025-03-02 PROCEDURE — 6370000000 HC RX 637 (ALT 250 FOR IP): Performed by: INTERNAL MEDICINE

## 2025-03-02 PROCEDURE — 80048 BASIC METABOLIC PNL TOTAL CA: CPT

## 2025-03-02 PROCEDURE — 97162 PT EVAL MOD COMPLEX 30 MIN: CPT

## 2025-03-02 PROCEDURE — 97530 THERAPEUTIC ACTIVITIES: CPT

## 2025-03-02 PROCEDURE — 94640 AIRWAY INHALATION TREATMENT: CPT

## 2025-03-02 PROCEDURE — 2700000000 HC OXYGEN THERAPY PER DAY

## 2025-03-02 PROCEDURE — 99232 SBSQ HOSP IP/OBS MODERATE 35: CPT | Performed by: INTERNAL MEDICINE

## 2025-03-02 PROCEDURE — 94660 CPAP INITIATION&MGMT: CPT

## 2025-03-02 PROCEDURE — 2500000003 HC RX 250 WO HCPCS: Performed by: INTERNAL MEDICINE

## 2025-03-02 PROCEDURE — 94761 N-INVAS EAR/PLS OXIMETRY MLT: CPT

## 2025-03-02 PROCEDURE — 36415 COLL VENOUS BLD VENIPUNCTURE: CPT

## 2025-03-02 PROCEDURE — 85025 COMPLETE CBC W/AUTO DIFF WBC: CPT

## 2025-03-02 RX ORDER — PREDNISONE 20 MG/1
20 TABLET ORAL DAILY
Status: DISCONTINUED | OUTPATIENT
Start: 2025-03-03 | End: 2025-03-02 | Stop reason: HOSPADM

## 2025-03-02 RX ORDER — LANOLIN ALCOHOL/MO/W.PET/CERES
100 CREAM (GRAM) TOPICAL DAILY
Qty: 30 TABLET | Refills: 3 | Status: SHIPPED | OUTPATIENT
Start: 2025-03-03

## 2025-03-02 RX ORDER — PREDNISONE 20 MG/1
20 TABLET ORAL DAILY
Qty: 5 TABLET | Refills: 0 | Status: SHIPPED | OUTPATIENT
Start: 2025-03-03 | End: 2025-03-08

## 2025-03-02 RX ORDER — CALCIUM CARBONATE 500 MG/1
1000 TABLET, CHEWABLE ORAL 3 TIMES DAILY PRN
Qty: 30 TABLET | Refills: 0 | Status: SHIPPED | OUTPATIENT
Start: 2025-03-02 | End: 2025-03-12

## 2025-03-02 RX ADMIN — PREDNISONE 30 MG: 20 TABLET ORAL at 08:58

## 2025-03-02 RX ADMIN — IPRATROPIUM BROMIDE AND ALBUTEROL SULFATE 1 DOSE: .5; 2.5 SOLUTION RESPIRATORY (INHALATION) at 07:27

## 2025-03-02 RX ADMIN — FLUOXETINE HYDROCHLORIDE 60 MG: 20 CAPSULE ORAL at 08:58

## 2025-03-02 RX ADMIN — LOSARTAN POTASSIUM 100 MG: 100 TABLET, FILM COATED ORAL at 08:58

## 2025-03-02 RX ADMIN — PANTOPRAZOLE SODIUM 40 MG: 40 TABLET, DELAYED RELEASE ORAL at 06:26

## 2025-03-02 RX ADMIN — AMLODIPINE BESYLATE 5 MG: 5 TABLET ORAL at 08:58

## 2025-03-02 RX ADMIN — SODIUM CHLORIDE, PRESERVATIVE FREE 10 ML: 5 INJECTION INTRAVENOUS at 08:59

## 2025-03-02 RX ADMIN — IPRATROPIUM BROMIDE AND ALBUTEROL SULFATE 1 DOSE: .5; 2.5 SOLUTION RESPIRATORY (INHALATION) at 11:09

## 2025-03-02 RX ADMIN — Medication 100 MG: at 08:59

## 2025-03-02 RX ADMIN — ASPIRIN 81 MG: 81 TABLET, CHEWABLE ORAL at 08:58

## 2025-03-02 NOTE — FLOWSHEET NOTE
03/01/25 2044   Vital Signs   Temp 97.5 °F (36.4 °C)   Temp Source Oral   Pulse 72   Heart Rate Source Monitor   Respirations 17   BP (!) 142/81   MAP (Calculated) 101   BP Location Right upper arm   BP Method Automatic   Patient Position Semi fowlers   Pain Assessment   Pain Assessment None - Denies Pain   Opioid-Induced Sedation   POSS Score 1   RASS   Brar Agitation Sedation Scale (RASS) 0   Oxygen Therapy   SpO2 94 %   O2 Device Nasal cannula   O2 Flow Rate (L/min) 1 L/min     PM assessment completed. Alert and oriented x4. No complaints of pain or discomfort voiced. No signs or symptoms of distress noted. On 1lpm oxygen via nasal cannula. Patient tolerated PM medications well. Respirations easy and even. Bed in lowest position, bed alarm in place and functioning properly, bed rails x2 up,  Call light within reach.     Bedside Mobility Assessment Tool (BMAT):     Assessment Level 1- Sit and Shake    1. From a semi-reclined position, ask patient to sit up and rotate to a seated position at the side of the bed. Can use the bedrail.    2. Ask patient to reach out and grab your hand and shake making sure patient reaches across his/her midline.   Pass- Patient is able to come to a seated position, maintain core strength. Maintains seated balance while reaching across midline. Move on to Assessment Level 2.     Assessment Level 2- Stretch and Point   1. With patient in seated position at the side of the bed, have patient place both feet on the floor (or stool) with knees no higher than hips.    2. Ask patient to stretch one leg and straighten the knee, then bend the ankle/flex and point the toes. If appropriate, repeat with the other leg.   Pass- Patient is able to demonstrate appropriate quad strength on intended weight bearing limb(s). Move onto Assessment Level 3.     Assessment Level 3- Stand   1. Ask patient to elevate off the bed or chair (seated to standing) using an assistive device (cane, bedrail).  Taltz Counseling: I discussed with the patient the risks of ixekizumab including but not limited to immunosuppression, serious infections, worsening of inflammatory bowel disease and drug reactions.  The patient understands that monitoring is required including a PPD at baseline and must alert us or the primary physician if symptoms of infection or other concerning signs are noted.

## 2025-03-02 NOTE — PROGRESS NOTES
02/23/25 1700   NIV Type   Equipment Type V690   Mode Bilevel   Mask Type Full face mask   Mask Size Large   Assessment   Pulse 96   Respirations 24   SpO2 (!) 88 %   Settings/Measurements   IPAP 14 cmH20   CPAP/EPAP 7 cmH2O   Vt (Measured) 603 mL   Rate Ordered 16   FiO2  50 %   Minute Volume (L/min) 13.8 Liters   Mask Leak (lpm) 8 lpm   Patient's Home Machine No   Alarm Settings   Alarms On Y   Low Pressure (cmH2O) 5 cmH2O   High Pressure (cmH2O) 40 cmH2O   Delay Alarm 20 sec(s)   RR Low (bpm) 12   RR High (bpm) 40 br/min       
   02/23/25 1944   NIV Type   Equipment Type v60   Mode Bilevel   Mask Type Full face mask   Mask Size Large   Assessment   Pulse 75   Respirations 30   SpO2 95 %   Settings/Measurements   IPAP 14 cmH20   CPAP/EPAP 7 cmH2O   Vt (Measured) 922 mL   Rate Ordered 16   FiO2  50 %   Minute Volume (L/min) 14.7 Liters   Mask Leak (lpm) 10 lpm   Patient's Home Machine No   Patient Observation   Patient Observations spo2 96% on 50% bipap       
   02/23/25 2244   NIV Type   Equipment Type v60   Mode Bilevel   Mask Type Full face mask   Mask Size Large   Assessment   Pulse 75   Respirations 28   SpO2 96 %   Settings/Measurements   IPAP 14 cmH20   CPAP/EPAP 7 cmH2O   Vt (Measured) 886 mL   Rate Ordered 16   FiO2  50 %   Minute Volume (L/min) 16.2 Liters   Mask Leak (lpm) 24 lpm   Patient Observation   Patient Observations spo2 95% on 50% bipap       
   02/24/25 0309   NIV Type   NIV Started/Stopped On   Equipment Type v60   Mode Bilevel   Mask Type Full face mask   Mask Size Large   Assessment   Pulse 78   Respirations 23   SpO2 94 %   Settings/Measurements   IPAP 14 cmH20   CPAP/EPAP 7 cmH2O   Vt (Measured) 749 mL   Rate Ordered 16   FiO2  50 %   Minute Volume (L/min) 19 Liters   Mask Leak (lpm) 57 lpm   Patient's Home Machine No       
   02/24/25 0744   NIV Type   NIV Started/Stopped On   Equipment Type v60   Mode Bilevel   Mask Type Full face mask   Mask Size Large   Bonnet size Large   Assessment   Pulse 90   Respirations 28   SpO2 95 %   Level of Consciousness 0   Comfort Level Good   Using Accessory Muscles No   Mask Compliance Good   Skin Assessment Clean, dry, & intact   Skin Protection for O2 Device Yes   Breath Sounds   Breath Sounds Bilateral Diminished   Settings/Measurements   PIP Observed 14 cm H20   IPAP 14 cmH20   CPAP/EPAP 7 cmH2O   Vt (Measured) 635 mL   Rate Ordered 16   FiO2  50 %   Minute Volume (L/min) 17 Liters   Mask Leak (lpm) 34 lpm   Patient's Home Machine No   Alarm Settings   Alarms On Y   Low Pressure (cmH2O) 5 cmH2O   High Pressure (cmH2O) 40 cmH2O   Delay Alarm 20 sec(s)   RR Low (bpm) 12   RR High (bpm) 40 br/min       
   02/24/25 2250   NIV Type   NIV Started/Stopped On   Equipment Type v60   Mode Bilevel   Mask Type Full face mask   Mask Size Large   Assessment   Pulse 67   Respirations 26   SpO2 96 %   Comfort Level Good   Using Accessory Muscles No   Mask Compliance Good   Skin Assessment Clean, dry, & intact   Skin Protection for O2 Device Yes   Orientation Middle   Location Nose   Intervention(s) Skin Barrier   Breath Sounds   Respiratory Pattern Tachypneic   Right Upper Lobe Diminished   Right Middle Lobe Diminished   Right Lower Lobe Diminished   Left Upper Lobe Diminished   Left Lower Lobe Diminished   Settings/Measurements   PIP Observed 15 cm H20   IPAP 14 cmH20   CPAP/EPAP 7 cmH2O   Vt (Measured) 651 mL   Rate Ordered 16   Insp Rise Time (%) 3 %   FiO2  50 %   I Time/ I Time % 1 s   Minute Volume (L/min) 17 Liters   Mask Leak (lpm) 30 lpm   Patient's Home Machine No   Alarm Settings   Alarms On Y   Low Pressure (cmH2O) 5 cmH2O   High Pressure (cmH2O) 40 cmH2O   Apnea (secs) 20 secs   RR Low (bpm) 12   RR High (bpm) 40 br/min       
   02/25/25 5632   NIV Type   NIV Started/Stopped On  (RN placed on @ 2200)   Equipment Type v60   Mode Bilevel   Mask Type Full face mask   Mask Size Large   Assessment   Pulse 59   Respirations 19   SpO2 97 %   Comfort Level Good   Using Accessory Muscles No   Mask Compliance Good   Skin Assessment Clean, dry, & intact   Skin Protection for O2 Device Yes   Orientation Middle   Location Nose   Intervention(s) Skin Barrier   Breath Sounds   Respiratory Pattern Tachypneic   Right Upper Lobe Diminished   Right Middle Lobe Diminished   Right Lower Lobe Diminished   Left Upper Lobe Diminished   Left Lower Lobe Diminished   Settings/Measurements   PIP Observed 15 cm H20   IPAP 14 cmH20   CPAP/EPAP 7 cmH2O   Vt (Measured) 780 mL   Rate Ordered 16   Insp Rise Time (%) 3 %   FiO2  50 %   I Time/ I Time % 1 s   Minute Volume (L/min) 20.1 Liters   Mask Leak (lpm) 46 lpm   Patient's Home Machine No   Alarm Settings   Alarms On Y   Low Pressure (cmH2O) 5 cmH2O   High Pressure (cmH2O) 40 cmH2O   Apnea (secs) 20 secs   RR Low (bpm) 12   RR High (bpm) 40 br/min       
   02/26/25 0308   NIV Type   NIV Started/Stopped On   Equipment Type v60   Mode Bilevel   Mask Type Full face mask   Mask Size Large   Assessment   Pulse 66   Respirations 26   SpO2 99 %   Comfort Level Good   Using Accessory Muscles No   Mask Compliance Good   Skin Assessment Clean, dry, & intact   Skin Protection for O2 Device Yes   Orientation Middle   Location Nose   Intervention(s) Skin Barrier   Breath Sounds   Respiratory Pattern Tachypneic   Right Upper Lobe Diminished   Right Middle Lobe Diminished   Right Lower Lobe Diminished   Left Upper Lobe Diminished   Left Lower Lobe Diminished   Settings/Measurements   PIP Observed 15 cm H20   IPAP 14 cmH20   CPAP/EPAP 7 cmH2O   Vt (Measured) 883 mL   Rate Ordered 16   Insp Rise Time (%) 3 %   FiO2  50 %   I Time/ I Time % 1 s   Minute Volume (L/min) 21 Liters   Mask Leak (lpm) 44 lpm   Patient's Home Machine No   Alarm Settings   Alarms On Y   Low Pressure (cmH2O) 5 cmH2O   High Pressure (cmH2O) 40 cmH2O   Apnea (secs) 20 secs   RR Low (bpm) 12   RR High (bpm) 40 br/min       
   02/27/25 0003   NIV Type   Equipment Type V60   Mode Bilevel   Mask Type Full face mask   Mask Size Large   Assessment   Pulse 69   Respirations 26   SpO2 98 %   Level of Consciousness 0   Comfort Level Good   Using Accessory Muscles No   Mask Compliance Good   Skin Assessment Clean, dry, & intact   Skin Protection for O2 Device Yes   Settings/Measurements   IPAP 14 cmH20   CPAP/EPAP 7 cmH2O   Vt (Measured) 539 mL   Rate Ordered 16   FiO2  40 %   Minute Volume (L/min) 14.1 Liters   Mask Leak (lpm) 39 lpm   Patient's Home Machine No   Alarm Settings   Alarms On Y       
   02/27/25 0303   NIV Type   Equipment Type V60   Mode Bilevel   Mask Type Full face mask   Mask Size Large   Assessment   Pulse 66   Respirations 23   SpO2 98 %   Settings/Measurements   IPAP 14 cmH20   CPAP/EPAP 7 cmH2O   Vt (Measured) 490 mL   Rate Ordered 16   FiO2  40 %   Minute Volume (L/min) 11.1 Liters   Mask Leak (lpm) 31 lpm   Patient's Home Machine No   Alarm Settings   Alarms On Y       
   02/27/25 2148   NIV Type   NIV Started/Stopped (S)  On   Equipment Type V60   Mode Bilevel   Mask Type Full face mask   Mask Size Large   Assessment   Pulse 63   Respirations 26   SpO2 99 %   Level of Consciousness 0   Comfort Level Good   Using Accessory Muscles No   Mask Compliance Good   Skin Assessment Clean, dry, & intact   Skin Protection for O2 Device Yes   Settings/Measurements   IPAP 14 cmH20   CPAP/EPAP 7 cmH2O   Vt (Measured) 573 mL   Rate Ordered 16   FiO2  50 %   Minute Volume (L/min) 15.9 Liters   Mask Leak (lpm) 7 lpm   Patient's Home Machine No   Alarm Settings   Alarms On Y       
   02/28/25 0259   NIV Type   NIV Started/Stopped (S)  Off   Assessment   Pulse 69   Respirations 20   SpO2 94 %       
   02/28/25 0305   NIV Type   NIV Started/Stopped (S)  On   Equipment Type V60   Mode Bilevel   Mask Type Full face mask   Mask Size Large   Assessment   Pulse 66   Respirations 28   SpO2 93 %   Settings/Measurements   IPAP 14 cmH20   CPAP/EPAP 7 cmH2O   Vt (Measured) 502 mL   Rate Ordered 16   FiO2  50 %   Minute Volume (L/min) 12.9 Liters   Mask Leak (lpm) 0 lpm   Patient's Home Machine No   Alarm Settings   Alarms On Y       
   02/28/25 7243   NIV Type   NIV Started/Stopped (S)  On   Equipment Type V60   Mode Bilevel   Mask Type Full face mask   Mask Size Large   Assessment   Pulse 70   Respirations 25   SpO2 97 %   Level of Consciousness 0   Comfort Level Good   Using Accessory Muscles No   Mask Compliance Good   Skin Assessment Clean, dry, & intact   Skin Protection for O2 Device Yes   Settings/Measurements   IPAP 14 cmH20   CPAP/EPAP 7 cmH2O   Vt (Measured) 799 mL   Rate Ordered 16   FiO2  50 %   Minute Volume (L/min) 22.1 Liters   Mask Leak (lpm) 12 lpm   Patient's Home Machine No   Alarm Settings   Alarms On Y       
   03/01/25 0304   NIV Type   Equipment Type v60   Mode Bilevel   Mask Type Full face mask   Mask Size Large   Assessment   Pulse 65   Respirations 23   SpO2 98 %   Settings/Measurements   IPAP 14 cmH20   CPAP/EPAP 7 cmH2O   Vt (Measured) 738 mL   Rate Ordered 16   FiO2  50 %   Minute Volume (L/min) 20.1 Liters   Mask Leak (lpm) 19 lpm   Patient's Home Machine No   Patient Observation   Patient Observations spo2 99% on 50% bipap       
   03/01/25 0800   RT Protocol   History Pulmonary Disease 2   Respiratory pattern 2   Breath sounds 2   Cough 0   Indications for Bronchodilator Therapy Decreased or absent breath sounds   Bronchodilator Assessment Score 6       
   03/01/25 2306   NIV Type   Equipment Type v60   Mode Bilevel   Mask Type Full face mask   Mask Size Large   Settings/Measurements   IPAP 14 cmH20   CPAP/EPAP 7 cmH2O   Vt (Measured) 918 mL   Rate Ordered 16   FiO2  35 %   Minute Volume (L/min) 16.8 Liters   Mask Leak (lpm) 3 lpm   Patient's Home Machine No   Patient Observation   Patient Observations spo2 97% on 35% bipap       
  Internal Medicine ICU Progress Note      Events of Last 24 hours:     Patient admitted the ICU for acute respiratory failure due to underlying influenza A.  Placed on BiPAP.  This morning he is subjectively feeling some better.  Workup also showed right-sided pneumonia.  Also had complaints of diarrhea.  Lifelong non-smoker.  He was initially on BiPAP 14/7 with FiO2 50% and then transitioned over to nasal cannula oxygen at 15 L.      Invasive Lines: PIV        MV: N/A  No results for input(s): \"PHART\", \"AMD5ILE\", \"PO2ART\" in the last 72 hours.    MV Settings:     / / /FiO2 : 60 %    IV:   sodium chloride      potassium chloride 40 mEq, sodium bicarbonate 100 mEq in dextrose 5 % and 0.9 % NaCl 1,000 mL infusion 50 mL/hr at 25 0000       Vitals:  Temp  Av.4 °F (36.9 °C)  Min: 97.5 °F (36.4 °C)  Max: 99.1 °F (37.3 °C)  Pulse  Av  Min: 74  Max: 97  BP  Min: 122/65  Max: 164/73  SpO2  Av.1 %  Min: 87 %  Max: 97 %  FiO2   Av.7 %  Min: 50 %  Max: 60 %  Patient Vitals for the past 4 hrs:   BP Pulse Resp SpO2   25 1200 -- 75 29 93 %   25 1100 124/76 78 (!) 31 92 %   25 1047 -- -- -- 93 %   25 1000 (!) 149/66 77 (!) 33 93 %       CVP:        Intake/Output Summary (Last 24 hours) at 2025 1341  Last data filed at 2025 1000  Gross per 24 hour   Intake 1909.05 ml   Output 1500 ml   Net 409.05 ml       EXAM:  General: Ill-appearing  Eyes: PERRL. No sclera icterus. No conjunctiva injected.  ENT: No discharge. Pharynx clear.  Neck: Trachea midline. Normal thyroid.  Resp: + accessory muscle use. No crackles. + wheezing. No rhonchi. No dullness on percussion.   CV: Regular rate. Regular rhythm. No mumur or rub. No edema. No JVD. Palpable pedal pulses.   GI: Non-tender. Non-distended. No masses. No organmegaly. Normal bowel sounds. No hernia.  Skin: Warm and dry. No nodule on exposed extremities. No rash on exposed extremities.  Lymph: No cervical LAD. No supraclavicular 
  [facility] Progress Note           Subjective/   59 y.o. year old male who we are seeing in consultation for GRACIE/Hyponatremia.     HPI:  Renal function, sodium levels better with IVF's, off from 2/24, non-oliguric.  States shortness of breath better, on vapotherm.    ROS:  +weak, intake improving.    Objective/   GEN:  Chronically ill, BP (!) 164/82   Pulse 87   Temp 97.4 °F (36.3 °C) (Temporal)   Resp (!) 32   Ht 1.829 m (6')   Wt 105.3 kg (232 lb 2.3 oz)   SpO2 91%   BMI 31.48 kg/m²   HEENT: non-icteric, no JVD  CV: S1, S2 without m/r/g; no LE edema  RESP: CTA B without w/r/r; breathing wnl  ABD: +bs, soft, nt, no hsm  SKIN: warm, no rashes    Data/  Recent Labs     02/24/25  0614 02/25/25  0503 02/26/25  0510   WBC 3.3* 2.6* 3.7*   HGB 13.4* 12.3* 12.3*   HCT 38.5* 36.3* 35.0*   MCV 89.4 90.6 88.7    214 264     Recent Labs     02/23/25  1855 02/24/25  0013 02/24/25  0614 02/24/25  1057 02/24/25  1801 02/25/25  0503 02/26/25  0511   * 126* 131*   < > 134* 134* 136   K 3.0* 3.5 4.3  4.3   < > 3.7 3.6 3.5   CL 93* 98* 100   < > 104 104 105   CO2 17* 18* 16*   < > 17* 17* 19*   GLUCOSE 136* 122* 137*   < > 184* 151* 153*   PHOS  --   --  2.1*  --   --   --  3.1   MG 1.86 1.88  --   --   --   --   --    BUN 27* 25* 25*   < > 22* 20 21*   CREATININE 1.3 1.3 1.2   < > 1.0 0.9 0.8*   LABGLOM 63 63 70   < > 87 >90 >90    < > = values in this interval not displayed.       Assessment/     - Hyponatremia:  Prior history of hyponatremia a few years ago.  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 Improving.    - Acute kidney injury - pre-renal   Resolved with IVF's    - Metabolic Acidosis:  In setting of GRACIE and impair net acid excretion.  Anion gap is normal.  Hemodynamically stable. Resolving    - Hypokalemia:  Nutritional, better with replacement    - Shortness of breath:  Patient with respiratory distress due 
4 Eyes Skin Assessment     NAME:  Juarez Saavedra  YOB: 1965  MEDICAL RECORD NUMBER:  [id number]    The patient is being assessed for  Admission    I agree that at least one RN has performed a thorough Head to Toe Skin Assessment on the patient. ALL assessment sites listed below have been assessed.      Areas assessed by both nurses:    Head, Face, Ears, Shoulders, Back, Chest, Arms, Elbows, Hands, Sacrum. Buttock, Coccyx, Ischium, Legs. Feet and Heels, and Under Medical Devices         Does the Patient have a Wound? No noted wound(s)       Parker Prevention initiated by RN: Yes  Wound Care Orders initiated by RN: No    Pressure Injury (Stage 3,4, Unstageable, DTI, NWPT, and Complex wounds) if present, place Wound referral order by RN under : No    New Ostomies, if present place, Ostomy referral order under : No     Nurse 1 eSignature: Electronically signed by Jessica Wei RN on 2/24/25 at 12:08 AM EST    **SHARE this note so that the co-signing nurse can place an eSignature**    Nurse 2 eSignature: Electronically signed by Nicole Walker RN on 2/24/25 at 6:56 AM EST   
4 Eyes Skin Assessment   Abrasion to nose noted. And scattered bruising.  NAME:  Juarez Saavedra  YOB: 1965  MEDICAL RECORD NUMBER:  [id number]    The patient is being assessed for  Transfer to New Unit    I agree that at least one RN has performed a thorough Head to Toe Skin Assessment on the patient. ALL assessment sites listed below have been assessed.      Areas assessed by both nurses:    Head, Face, Ears, Shoulders, Back, Chest, Arms, Elbows, Hands, Sacrum. Buttock, Coccyx, Ischium, and Legs. Feet and Heels        Does the Patient have a Wound? No noted wound(s)       Parker Prevention initiated by RN: No  Wound Care Orders initiated by RN: No    Pressure Injury (Stage 3,4, Unstageable, DTI, NWPT, and Complex wounds) if present, place Wound referral order by RN under : No    New Ostomies, if present place, Ostomy referral order under : No     Nurse 1 eSignature: Electronically signed by Greyson Kunz RN on 3/1/25 at 5:08 PM EST    **SHARE this note so that the co-signing nurse can place an eSignature**    Nurse 2 eSignature: Electronically signed by Natty Caba RN on 3/1/25 at 5:17 PM EST   
88% while ambulating to and from bsc on 6L/NC, 2-4 minutes to recover back on 91-92%  
Attempted to turn pt's O2 down to 5 liters but pt's O2 sat dropped from 96 to 88 % quickly. O2 returned to 6 liters but then pt desated to 88% with minimal activity.   
Bedside report given to Elvira ROSAS. POC transferred. RALPH Smith    
Bedside report given to Jannie ROSAS. POC transferred. RALPH Smith    
Bedside report given to Sheba ROSAS. POC transferred. RALPH Smith    
Care rounds completed with Dr. Rangel and multidisciplinary team. Reviewed labs, meds, VS, assessment, & plan of care for today. See dictated note and new orders for details.     Check labs  
Care rounds completed with Dr. Rangel and multidisciplinary team. Reviewed labs, meds, VS, assessment, & plan of care for today. See dictated note and new orders for details.     DC clonidine as pt does not take at home  
Care rounds completed with Dr. Rangel and multidisciplinary team. Reviewed labs, meds, VS, assessment, & plan of care for today. See dictated note and new orders for details.     DC doxycycline   Switch to prednisone daily tomorrow  Continue bipap PRN/HS  
Care rounds completed with Dr. Rangel and multidisciplinary team. Reviewed labs, meds, VS, assessment, & plan of care for today. See dictated note and new orders for details.     Try to wean to high flow  
O2 Sat at rest on room air is 90 %.  O2 Sat with activity on room air is 87-90 %.  O2 Sat at rest with oxygen @  2 lpm is 93%.  O2 Sat with activity with oxygen @ 2 lpm is 90-94%.   
Offered patient a bath or to provide supplies for self bathing. He said that he just gets so short of breath even with just turning in bed and didn't feel like it. He said he just felt more like sleeping.    Educated patient on hygieneand hospital use of CHG wipes for decrease in bacteria growth.    He continued to refuse for now but said maybe later.    Call light in reach. Bed in lowest position, wheels locked, and rails up x2 on left side of bed only.     Assessment completed. Noted that patient oxygen saturation decreased from 96% to 86% with just rolling from side lying to back. He took about 60 seconds to recover back to 91%.     Electronically signed by Elvira Silver RN on 2/26/2025 at 9:32 PM    
PERFECT SERVE SENT TO DR. COURTNEY FOR NEPHRO CONSULT Electronically signed by Asia Moreau on 2/23/2025 at 6:23 PM  
Patient admitted to  no pain and or discomfort noted. Patient walking with steady gait at this time. Telemetry in place and continuous pulse ox. Family at bedside. Patient states he understands how to use call light system. All safety precautions in place  
Patient admitted to ICU 7 from ED for bipap. Patient transported on NRB. RT at bedside to place on bipap. Patient connected to all ICU monitoring. Patient and family updated on current plan of care. Patient educated on use of call light and to call out with needs, verbalized understanding.    4 Eyes Skin Assessment     NAME:  Juarez Saavedra  YOB: 1965  MEDICAL RECORD NUMBER:  [id number]    The patient is being assessed for  Admission    I agree that at least one RN has performed a thorough Head to Toe Skin Assessment on the patient. ALL assessment sites listed below have been assessed.      Areas assessed by both nurses:    Head, Face, Ears, Shoulders, Back, Chest, Arms, Elbows, Hands, Sacrum. Buttock, Coccyx, Ischium, and Legs. Feet and Heels        Does the Patient have a Wound? No noted wound(s)       Parker Prevention initiated by RN: No  Wound Care Orders initiated by RN: No    Pressure Injury (Stage 3,4, Unstageable, DTI, NWPT, and Complex wounds) if present, place Wound referral order by RN under : No    New Ostomies, if present place, Ostomy referral order under : No     Nurse 1 eSignature: Electronically signed by Jasmin Zheng RN on 2/23/25 at 6:40 PM EST    **SHARE this note so that the co-signing nurse can place an eSignature**    Nurse 2 eSignature: Electronically signed by Neha Haddad RN on 2/23/25 at 6:41 PM EST    Patient is able to demonstrate the ability to move from a reclining position to an upright position within the recliner.    
[place] [facility] [place]by assist. Call light was given to him to call when finished.     After about 10 minutes patient called out to return back to bed. He was assisted by another staff member.     He is A&O x4.     VSS     02/24/25 2100   Vitals   Temp 98.3 °F (36.8 °C)   Temp Source Oral   Pulse 73   Heart Rate Source Monitor   Respirations 23   /65   MAP (Calculated) 85   MAP (mmHg) 84     Patient able to eat and drink without difficulty. He stated that he is feeling some better. He denied any needs at this time. He also refused bathing or for supplies to bath self. Educated patient on use of CHG bath to decrease bacteria growth. Continued to refuse at this time.    Call light in reach and patient has demonstrated correct use for calling for assistance. Bed in lowest position and wheels locked. Rails up 2/4.    Electronically signed by Elvira Silver RN on 2/24/2025 at 11:04 PM    
Patient continued with bipap through night and tolerating well. Spo2 98%. No distress noted.    Electronically signed by Elvira Silver RN on 2/26/2025 at 6:21 AM    
Patient given discharge instructions at this time. All questions answered. Case management gave patient oxygen tank and educated on how to use. Wheelchair assistance to family car.      O2 tank that was given by case management was not charged at the time of discharge, given 02 tank from [facility] at this time. No SOB noted and or reported  
Patient has been transferred to [facility] in stable condition. Report given at bedside to Angelica RN, Care transferred at this time. [facility] has been called and verified that Telemetry monitor is working correctly.  
Patient has slept through night and tolerated bipap well. VSS.     Report to CARIE Adams RN and care of patient transferred.    Electronically signed by Elvira Silver RN on 2/27/2025 at 7:27 AM    
Patient is room air at baseline, non-smoker. Desated as low as 84% while on 15 LPM NRB mask. BM X2.     Infusing:  Calcium gluconate  NaHCO3 + 40 MEq KCL @ 50 mls/hr    IV access remain intact.  
Patient placed on bipap for the hs on settings of 14/7 35%  
Placed pt on bipap due to increased WOB and hypoxia. Pt is tolerating well. 14/7 50%. Will continue to monitor.  
Prevention  dressing applied to bridge of nose and other facial bony prominences upon BiPAP/CPAP initiation.  Consulted RN regarding the assessment of skin integrity.     
Prevention  dressing applied to bridge of nose and other facial bony prominences upon BiPAP/CPAP initiation.  Consulted RN regarding the assessment of skin integrity.     
Pt c/o headache behind left eye and anxiety/restlessness. Gave prn tylenol per order, see MAR.   Perfect serve sent to Dr. Mercedes. New prn order. Gave ativan per order, see MAR.   
Pt resting comfortably in bed on 6L/HFNC. Denies complaints at this time.   
Pt sleeping well with BiPap on.  
Pt up to BSC, coughing fit while up. SpO2 90% but RR up to 45-50 while recovering.   Pt then up to recliner, pt tolerated well. SpO2 91-95%. RR 33.   
Pt.took BiPap off and was placed on O2 at 6 liters.  
Pulmonary Progress Note    CC: Respiratory failure    Subjective:   BiPAP overnight 14/7 50%   Vapotherm: Flow 30 LPM FiO2 50% ---> 8---> 6 L   Feels better     IV line: PIVs      Intake/Output Summary (Last 24 hours) at 2/28/2025 0787  Last data filed at 2/28/2025 0400  Gross per 24 hour   Intake 660 ml   Output 1950 ml   Net -1290 ml       Exam:   BP (!) 160/78   Pulse 65   Temp 97.4 °F (36.3 °C) (Axillary)   Resp 20   Ht 1.829 m (6')   Wt 102.1 kg (225 lb 1.4 oz)   SpO2 95%   BMI 30.53 kg/m²  on 6 LPM    Gen: No distress.   Eyes: No sclera icterus. No conjunctival injection.   Neck: Trachea midline. No obvious mass.    Resp: No accessory muscle use. No crackles. + wheezes. No rhonchi. No dullness on percussion.  CV: Regular rate. Regular rhythm. No murmur or rub. No edema.  GI: Non-tender. Non-distended. No hernia.   Skin: Warm and dry. No nodule on exposed extremities.   Lymph: No cervical LAD. No supraclavicular LAD.   M/S: No cyanosis. No joint deformity. No clubbing.   Neuro: Awake. Alert. Moves all four extremities.   Psych: Oriented. No anxiety.        Scheduled Meds:   cefTRIAXone (ROCEPHIN) IV  1,000 mg IntraVENous Q24H    thiamine  100 mg Oral Daily    pantoprazole  40 mg Oral QAM AC    methylPREDNISolone  40 mg IntraVENous Daily    amLODIPine  5 mg Oral Daily    aspirin  81 mg Oral Daily    FLUoxetine  60 mg Oral Daily    losartan  100 mg Oral Daily    ipratropium 0.5 mg-albuterol 2.5 mg  1 Dose Inhalation TID RT    mupirocin   Each Nostril BID    doxycycline hyclate  100 mg Oral 2 times per day    oseltamivir  75 mg Oral BID    sodium chloride flush  5-40 mL IntraVENous 2 times per day    enoxaparin  30 mg SubCUTAneous BID    melatonin  5 mg Oral Nightly     Continuous Infusions:   sodium chloride       PRN Meds:  LORazepam, calcium carbonate, ipratropium 0.5 mg-albuterol 2.5 mg, sodium chloride flush, sodium chloride, potassium chloride **OR** potassium alternative oral replacement **OR** 
RT Inhaler-Nebulizer Bronchodilator Protocol Note    There is a bronchodilator order in the chart from a provider indicating to follow the RT Bronchodilator Protocol and there is an “Initiate RT Inhaler-Nebulizer Bronchodilator Protocol” order as well (see protocol at bottom of note).    CXR Findings:  No results found.    The findings from the last RT Protocol Assessment were as follows:   History Pulmonary Disease: (P) Chronic pulmonary disease  Respiratory Pattern: (P) Dyspnea on exertion or RR 21-25 bpm  Breath Sounds: (P) Slightly diminished and/or crackles  Cough: (P) Strong, spontaneous, non-productive  Indication for Bronchodilator Therapy: (P) Decreased or absent breath sounds  Bronchodilator Assessment Score: (P) 6    Aerosolized bronchodilator medication orders have been revised according to the RT Inhaler-Nebulizer Bronchodilator Protocol below.    Respiratory Therapist to perform RT Therapy Protocol Assessment initially then follow the protocol.  Repeat RT Therapy Protocol Assessment PRN for score 0-3 or on second treatment, BID, and PRN for scores above 3.    No Indications - adjust the frequency to every 6 hours PRN wheezing or bronchospasm, if no treatments needed after 48 hours then discontinue using Per Protocol order mode.     If indication present, adjust the RT bronchodilator orders based on the Bronchodilator Assessment Score as indicated below.  Use Inhaler orders unless patient has one or more of the following: on home nebulizer, not able to hold breath for 10 seconds, is not alert and oriented, cannot activate and use MDI correctly, or respiratory rate 25 breaths per minute or more, then use the equivalent nebulizer order(s) with same Frequency and PRN reasons based on the score.  If a patient is on this medication at home then do not decrease Frequency below that used at home.    0-3 - enter or revise RT bronchodilator order(s) to equivalent RT Bronchodilator order with Frequency of every 4 
RT Inhaler-Nebulizer Bronchodilator Protocol Note    There is a bronchodilator order in the chart from a provider indicating to follow the RT Bronchodilator Protocol and there is an “Initiate RT Inhaler-Nebulizer Bronchodilator Protocol” order as well (see protocol at bottom of note).    CXR Findings:  No results found.    The findings from the last RT Protocol Assessment were as follows:   History Pulmonary Disease: (P) None or smoker <15 pack years  Respiratory Pattern: (P) Dyspnea on exertion or RR 21-25 bpm  Breath Sounds: (P) Slightly diminished and/or crackles  Cough: (P) Strong, productive  Indication for Bronchodilator Therapy: (P) Decreased or absent breath sounds  Bronchodilator Assessment Score: (P) 5    Aerosolized bronchodilator medication orders have been revised according to the RT Inhaler-Nebulizer Bronchodilator Protocol below.    Respiratory Therapist to perform RT Therapy Protocol Assessment initially then follow the protocol.  Repeat RT Therapy Protocol Assessment PRN for score 0-3 or on second treatment, BID, and PRN for scores above 3.    No Indications - adjust the frequency to every 6 hours PRN wheezing or bronchospasm, if no treatments needed after 48 hours then discontinue using Per Protocol order mode.     If indication present, adjust the RT bronchodilator orders based on the Bronchodilator Assessment Score as indicated below.  Use Inhaler orders unless patient has one or more of the following: on home nebulizer, not able to hold breath for 10 seconds, is not alert and oriented, cannot activate and use MDI correctly, or respiratory rate 25 breaths per minute or more, then use the equivalent nebulizer order(s) with same Frequency and PRN reasons based on the score.  If a patient is on this medication at home then do not decrease Frequency below that used at home.    0-3 - enter or revise RT bronchodilator order(s) to equivalent RT Bronchodilator order with Frequency of every 4 hours PRN 
RT Inhaler-Nebulizer Bronchodilator Protocol Note    There is a bronchodilator order in the chart from a provider indicating to follow the RT Bronchodilator Protocol and there is an “Initiate RT Inhaler-Nebulizer Bronchodilator Protocol” order as well (see protocol at bottom of note).    CXR Findings:  No results found.    The findings from the last RT Protocol Assessment were as follows:   History Pulmonary Disease: Chronic pulmonary disease  Respiratory Pattern: Dyspnea on exertion or RR 21-25 bpm  Breath Sounds: Slightly diminished and/or crackles  Cough: Strong, spontaneous, non-productive  Indication for Bronchodilator Therapy: Decreased or absent breath sounds  Bronchodilator Assessment Score: 6    Aerosolized bronchodilator medication orders have been revised according to the RT Inhaler-Nebulizer Bronchodilator Protocol below.    Respiratory Therapist to perform RT Therapy Protocol Assessment initially then follow the protocol.  Repeat RT Therapy Protocol Assessment PRN for score 0-3 or on second treatment, BID, and PRN for scores above 3.    No Indications - adjust the frequency to every 6 hours PRN wheezing or bronchospasm, if no treatments needed after 48 hours then discontinue using Per Protocol order mode.     If indication present, adjust the RT bronchodilator orders based on the Bronchodilator Assessment Score as indicated below.  Use Inhaler orders unless patient has one or more of the following: on home nebulizer, not able to hold breath for 10 seconds, is not alert and oriented, cannot activate and use MDI correctly, or respiratory rate 25 breaths per minute or more, then use the equivalent nebulizer order(s) with same Frequency and PRN reasons based on the score.  If a patient is on this medication at home then do not decrease Frequency below that used at home.    0-3 - enter or revise RT bronchodilator order(s) to equivalent RT Bronchodilator order with Frequency of every 4 hours PRN for wheezing 
RT Inhaler-Nebulizer Bronchodilator Protocol Note    There is a bronchodilator order in the chart from a provider indicating to follow the RT Bronchodilator Protocol and there is an “Initiate RT Inhaler-Nebulizer Bronchodilator Protocol” order as well (see protocol at bottom of note).    CXR Findings:  No results found.    The findings from the last RT Protocol Assessment were as follows:   History Pulmonary Disease: Chronic pulmonary disease  Respiratory Pattern: Dyspnea on exertion or RR 21-25 bpm  Breath Sounds: Slightly diminished and/or crackles  Cough: Strong, spontaneous, non-productive  Indication for Bronchodilator Therapy: Decreased or absent breath sounds  Bronchodilator Assessment Score: 6    Aerosolized bronchodilator medication orders have been revised according to the RT Inhaler-Nebulizer Bronchodilator Protocol below.    Respiratory Therapist to perform RT Therapy Protocol Assessment initially then follow the protocol.  Repeat RT Therapy Protocol Assessment PRN for score 0-3 or on second treatment, BID, and PRN for scores above 3.    No Indications - adjust the frequency to every 6 hours PRN wheezing or bronchospasm, if no treatments needed after 48 hours then discontinue using Per Protocol order mode.     If indication present, adjust the RT bronchodilator orders based on the Bronchodilator Assessment Score as indicated below.  Use Inhaler orders unless patient has one or more of the following: on home nebulizer, not able to hold breath for 10 seconds, is not alert and oriented, cannot activate and use MDI correctly, or respiratory rate 25 breaths per minute or more, then use the equivalent nebulizer order(s) with same Frequency and PRN reasons based on the score.  If a patient is on this medication at home then do not decrease Frequency below that used at home.    0-3 - enter or revise RT bronchodilator order(s) to equivalent RT Bronchodilator order with Frequency of every 4 hours PRN for wheezing 
RT Inhaler-Nebulizer Bronchodilator Protocol Note    There is a bronchodilator order in the chart from a provider indicating to follow the RT Bronchodilator Protocol and there is an “Initiate RT Inhaler-Nebulizer Bronchodilator Protocol” order as well (see protocol at bottom of note).    CXR Findings:  XR CHEST PORTABLE    Result Date: 2/24/2025  Opacity at the right mid and lower lung zones is unchanged from prior.     XR CHEST PORTABLE    Result Date: 2/23/2025  Interval development of right lung pneumonia.  Follow-up exam recommended.       The findings from the last RT Protocol Assessment were as follows:   History Pulmonary Disease: (P) None or smoker <15 pack years  Respiratory Pattern: (P) Dyspnea on exertion or RR 21-25 bpm  Breath Sounds: (P) Slightly diminished and/or crackles  Cough: (P) Strong, productive  Indication for Bronchodilator Therapy: (P) Decreased or absent breath sounds  Bronchodilator Assessment Score: (P) 5    Aerosolized bronchodilator medication orders have been revised according to the RT Inhaler-Nebulizer Bronchodilator Protocol below.    Respiratory Therapist to perform RT Therapy Protocol Assessment initially then follow the protocol.  Repeat RT Therapy Protocol Assessment PRN for score 0-3 or on second treatment, BID, and PRN for scores above 3.    No Indications - adjust the frequency to every 6 hours PRN wheezing or bronchospasm, if no treatments needed after 48 hours then discontinue using Per Protocol order mode.     If indication present, adjust the RT bronchodilator orders based on the Bronchodilator Assessment Score as indicated below.  Use Inhaler orders unless patient has one or more of the following: on home nebulizer, not able to hold breath for 10 seconds, is not alert and oriented, cannot activate and use MDI correctly, or respiratory rate 25 breaths per minute or more, then use the equivalent nebulizer order(s) with same Frequency and PRN reasons based on the score.  If 
Reassessment completed, see flowsheets, unchanged from prior. VSS.   Pt on vapotherm 25L FiO2 50%.     Pt back in bed. All ICU Lines and monitoring remain in place. Bed locked in lowest position. Call light within reach.     
Reassessment completed, see flowsheets, unchanged from prior. VSS. Continues on 8L/HFNC with dyspnea and increased WOB with exertion.      All ICU Lines and monitoring remain in place. Bed locked in lowest position. Call light within reach.   
Reassessment completed, see flowsheets, unchanged from prior. VSS. Currently on 8L/HFNC.     All ICU Lines and monitoring remain in place. Bed locked in lowest position. Call light within reach.     
Reassessment completed, see flowsheets, unchanged from prior. VSS. Pt denies pain, headache gone. Pt no longer anxious/restless.     Pt on vapotherm 20L FiO2 50%.      Pt back in bed. All ICU Lines and monitoring remain in place. Bed locked in lowest position. Call light within reach.  
Reassessment completed, see flowsheets, unchanged from prior. VSS. Vapotherm 30L FiO2 50%     All ICU Lines and monitoring remain in place. Bed locked in lowest position. Call light within reach.   
Reassessment completed, see flowsheets, unchanged from prior. VSS. Vapotherm 30L FiO2 55%    All ICU Lines and monitoring remain in place. Bed locked in lowest position. Call light within reach.     
Report to CARIE Adams RN. Reviewed patient status over night and POC. Patient stable at time of shift handoff.    Electronically signed by Elvira Silver RN on 2/25/2025 at 7:20 AM    
Report to CARIE Adams RN. Reviewed status overnight and POC. Patient care then transferred in stable condition.    Electronically signed by Elvira Silver RN on 2/26/2025 at 7:24 AM    
Shift assessment complete see flow sheet respirations easy and even. Patient denies any pain or needs at this time. Bed is locked in the lowest position with call light within reach.    
Shift assessment complete.     Vapotherm @ 30 L & 50% Fio2  Spo2 93%  Desaturation with exertion. Spo2 82% when ambulated to BSC and back to bed.       02/25/25 2020   Vitals   Pulse 78   Heart Rate Source Monitor   Respirations (!) 32   BP (!) 162/63   MAP (Calculated) 96   MAP (mmHg) 89     RT was notified of patient need for breathing treatment and assessment.     Patient also refused bathing or supplies for bathing due to being short of breath and requiring need for respiratory intervention.    Electronically signed by Elvira Silver RN on 2/25/2025 at 10:36 PM    
Shift assessment completed (see flowsheet). Patient is alert and oriented X4. On BIPAP with setting s as follows: FIO2:50% VR 16, EPAP=7    Infusin mls NSS        IV access:  PIV left AC  PIV Right dorsal hand    Able to void using the urinal.    Selfer.   Bed in lowest position. Call light within reach.      
Shift assessment done,patient is in bed,A/0 ,on oxygen via HFNC at 8l/min,has diminished breath sounds bilaterally,on regular diet,able to void via urinal,denies being in pain,noted to be dyspneac on exertion.  Bed is at its lowest level,call light within reach,will continue to monitor patient.  
Shift assessment, completed, see flow sheet. Pt is alert and oriented x 4. Following commands.      SR 79, /79 (103), SpO2 95% on 10 L/HFNC.     Respirations are easy, even, and unlabored. Bilateral lung sounds diminished. Pt tachypneic with exertion. Up to BSC, SpO2 dropped to 85% on 10L, up to 15L and SpO2 90%. Pt tachypneic while up, once in bed pt recovered in 2 minutes and back to 10L/HFNC and 94%.     2 PIVs, WNL with saline locked .      Call light within reach. Bed in lowest position. Bed alarm on.  
Shift assessment, completed, see flow sheet. Pt is alert and oriented x 4. Following commands.      SR 90, /66 (88), SpO2 95% on vapotherm 30L FiO2 50%. Respirations are easy, even, and unlabored. Bilateral lung sounds diminished. Pt tachypneic with exertion. 2 PIVs, WNL with saline locked .     Call light within reach. Bed in lowest position. Bed alarm on.   
Shift assessment, completed, see flow sheet. Pt is alert and oriented x 4. Following commands.      SR 98, /72 (93), SpO2 92% on 6 L/HFNC.      Respirations are easy, even, and unlabored. Bilateral lung sounds diminished. Pt tachypneic with exertion. Pt with occasional coughing episodes which will cause him to desat 86-88%.     2 PIVs, WNL with saline locked .      Call light within reach. Bed in lowest position. Bed alarm on.  
Shift assessment, completed, see flow sheet. Pt is alert and oriented x 4. Following commands.     SR 71, /65 (84), SpO2 93% on vapotherm 30L FiO2 55%. Respirations are easy, even, and unlabored. Bilateral lung sounds diminished. Pt tachypneic with exertion. 2 PIVs, WNL with saline locked .     Pt up to bedside commode.     Call light within reach. Bed in lowest position. Bed alarm on.     
The following is only change in patient assessment. He now has Bipap on with full face mask with Fio2 50%. Spo2 99%. He is tolerating well and appears to be sleeping. No distress noted. Call light in reach on bed to patient's right side.     Electronically signed by Elvira Silver RN on 2/25/2025 at 12:40 AM    
137 136  --  137   K 3.9  --  3.7 3.9    104  --  105   CO2 17* 20*  --  21   BUN 21* 20  --  20   CREATININE 0.8* 0.8*  --  0.7*     LIVER PROFILE:   No results for input(s): \"AST\", \"ALT\", \"LIPASE\", \"AMYLASE\", \"BILIDIR\", \"BILITOT\", \"ALKPHOS\" in the last 72 hours.    Invalid input(s): \"ALB\"    PT/INR: No results for input(s): \"PROTIME\", \"INR\" in the last 72 hours.  APTT: No results for input(s): \"APTT\" in the last 72 hours.  UA:  No results for input(s): \"NITRITE\", \"COLORU\", \"PHUR\", \"LABCAST\", \"WBCUA\", \"RBCUA\", \"MUCUS\", \"TRICHOMONAS\", \"YEAST\", \"BACTERIA\", \"CLARITYU\", \"SPECGRAV\", \"LEUKOCYTESUR\", \"UROBILINOGEN\", \"BILIRUBINUR\", \"BLOODU\", \"GLUCOSEU\", \"AMORPHOUS\" in the last 72 hours.    Invalid input(s): \"KETONESU\"    No results for input(s): \"PHART\", \"GLN7VTG\", \"PO2ART\" in the last 72 hours.      Microbiology:  2/23 influenza A DETECTED  2/23 BC NGTD   2/23 pneumonia panel Negative   2/24 Resp NGTD       Imaging:  Chest x-ray 2/24   Lower lobe and right midlung airspace disease        ASSESSMENT:  Acute hypoxemic respiratory failure  Multifocal PNA   Influenza A  Bronchospasm           PLAN:  Supplemental oxygen to maintain SaO2 >92%; wean as tolerated  BiPAP as needed  Inhaled bronchodilators  Prednisone 30-->20 mg daily  Monitor blood glucose for hyperglycemia.  Completed Ceftriaxone D#5/5 and Doxy D#5/5. Completed Tamiflu D#5  I recommend CXR in 4-6 week to document resolution of abnormalities   Acapella and Mucinex  DVT prophylaxis: Lovenox  DC plan is okay with pulmonary    
Center  [email or web address] [facility] [employer]  Office: [phone]    
IVF's from 2/24, encouraged oral intake  - Trend labs, bp's, weights, & urine output    ____________________________________  Solitario Huynh MD  [facility]  [email or web address]  Office: [phone]    
bronchodilator order(s) to equivalent RT Bronchodilator order with Frequency of every 4 hours PRN for wheezing or increased work of breathing using Per Protocol order mode.        4-6 - enter or revise RT Bronchodilator order(s) to two equivalent RT bronchodilator orders with one order with BID Frequency and one order with Frequency of every 4 hours PRN wheezing or increased work of breathing using Per Protocol order mode.        7-10 - enter or revise RT Bronchodilator order(s) to two equivalent RT bronchodilator orders with one order with TID Frequency and one order with Frequency of every 4 hours PRN wheezing or increased work of breathing using Per Protocol order mode.       11-13 - enter or revise RT Bronchodilator order(s) to one equivalent RT bronchodilator order with QID Frequency and an Albuterol order with Frequency of every 4 hours PRN wheezing or increased work of breathing using Per Protocol order mode.      Greater than 13 - enter or revise RT Bronchodilator order(s) to one equivalent RT bronchodilator order with every 4 hours Frequency and an Albuterol order with Frequency of every 2 hours PRN wheezing or increased work of breathing using Per Protocol order mode.       Electronically signed by Gladys Fontaine RCP on 2/25/2025 at 8:24 PM  
handrail  Steps to second floor/basement: Full flight of 12-13  Laundry:     1st floor  Bathroom:     walk in shower, shower chair , and standard height toilet  Pt sleeps in a:    Flat bed  Equipment owned:    none, pt has been given home O2    Preadmission Status:  Pt able to drive: Yes  Pt fully independent with ADLs: Yes  Pt receives assistance from family for: Independent PTA  Pt independent for functional transfers and utilized No Device for mobility in home and No Device out in community  History of falls: No  Home Health Services:None    AM-PAC Mobility Score    AM-PAC Inpatient Mobility Raw Score : 23       Subjective  Patient sitting up in chair with no family present.  Pt agreeable to this PT session.     Cognition    A&O x4   Able to follow 2 step commands    Pain   No    Activity Tolerance   During therapy session noted pt with no adverse symptoms    Pt Position BP (mmHg) HR (bpm) SpO2 (%) on 1L  Comments   Sitting up in chair  90 94    Following 50ft ambulation  105 92-93%    Following 5x STS  105 92%    End of session         Objective  Does this pt have an acute or acute on chronic diagnosis of CHF? No    Upper Extremity ROM/Strength  Please see OT evaluation.      Lower Extremity ROM / Strength   AROM WFL: Yes  ROM limitations:     BLE strength WFL, but not formally assessed with MMT.     Lower Extremity Sensation    NT    Coordination  NT    Tone  Not Tested    Balance  Static Sitting:  Supervision  Dynamic Sitting:  Supervision   Comments:     Static Standing: SBA  Dynamic Standing:  SBA  Comments:     Posture  Seated: Forward head and neck  Standing: Forward head and neck    Bed Mobility   Supine to Sit:    Not Tested  Sit to Supine:   Not Tested  Rolling:   Not Tested  Scooting in sitting: Independent  Scooting in supine:  Not Tested   Bridging:  Not Tested  Comments:     Transfer Training     Sit to stand:   SBA  Stand to sit:   SBA  Bed to/from Chair:  SBA   Comments:     Gait gait completed as 
on the score.  If a patient is on this medication at home then do not decrease Frequency below that used at home.    0-3 - enter or revise RT bronchodilator order(s) to equivalent RT Bronchodilator order with Frequency of every 4 hours PRN for wheezing or increased work of breathing using Per Protocol order mode.        4-6 - enter or revise RT Bronchodilator order(s) to two equivalent RT bronchodilator orders with one order with BID Frequency and one order with Frequency of every 4 hours PRN wheezing or increased work of breathing using Per Protocol order mode.        7-10 - enter or revise RT Bronchodilator order(s) to two equivalent RT bronchodilator orders with one order with TID Frequency and one order with Frequency of every 4 hours PRN wheezing or increased work of breathing using Per Protocol order mode.       11-13 - enter or revise RT Bronchodilator order(s) to one equivalent RT bronchodilator order with QID Frequency and an Albuterol order with Frequency of every 4 hours PRN wheezing or increased work of breathing using Per Protocol order mode.      Greater than 13 - enter or revise RT Bronchodilator order(s) to one equivalent RT bronchodilator order with every 4 hours Frequency and an Albuterol order with Frequency of every 2 hours PRN wheezing or increased work of breathing using Per Protocol order mode.       Electronically signed by Gladys Fontaine RCP on 2/24/2025 at 8:08 PM  
or increased work of breathing using Per Protocol order mode.        4-6 - enter or revise RT Bronchodilator order(s) to two equivalent RT bronchodilator orders with one order with BID Frequency and one order with Frequency of every 4 hours PRN wheezing or increased work of breathing using Per Protocol order mode.        7-10 - enter or revise RT Bronchodilator order(s) to two equivalent RT bronchodilator orders with one order with TID Frequency and one order with Frequency of every 4 hours PRN wheezing or increased work of breathing using Per Protocol order mode.       11-13 - enter or revise RT Bronchodilator order(s) to one equivalent RT bronchodilator order with QID Frequency and an Albuterol order with Frequency of every 4 hours PRN wheezing or increased work of breathing using Per Protocol order mode.      Greater than 13 - enter or revise RT Bronchodilator order(s) to one equivalent RT bronchodilator order with every 4 hours Frequency and an Albuterol order with Frequency of every 2 hours PRN wheezing or increased work of breathing using Per Protocol order mode.       Electronically signed by Gi Goldman RCP on 2/27/2025 at 7:00 AM  
or increased work of breathing using Per Protocol order mode.        4-6 - enter or revise RT Bronchodilator order(s) to two equivalent RT bronchodilator orders with one order with BID Frequency and one order with Frequency of every 4 hours PRN wheezing or increased work of breathing using Per Protocol order mode.        7-10 - enter or revise RT Bronchodilator order(s) to two equivalent RT bronchodilator orders with one order with TID Frequency and one order with Frequency of every 4 hours PRN wheezing or increased work of breathing using Per Protocol order mode.       11-13 - enter or revise RT Bronchodilator order(s) to one equivalent RT bronchodilator order with QID Frequency and an Albuterol order with Frequency of every 4 hours PRN wheezing or increased work of breathing using Per Protocol order mode.      Greater than 13 - enter or revise RT Bronchodilator order(s) to one equivalent RT bronchodilator order with every 4 hours Frequency and an Albuterol order with Frequency of every 2 hours PRN wheezing or increased work of breathing using Per Protocol order mode.     PATIENT WILL BENEFIT FROM TREATMENTS.     Electronically signed by Theresa Talbert RCP on 2/28/2025 at 7:25 PM  
patient is on this medication at home then do not decrease Frequency below that used at home.    0-3 - enter or revise RT bronchodilator order(s) to equivalent RT Bronchodilator order with Frequency of every 4 hours PRN for wheezing or increased work of breathing using Per Protocol order mode.        4-6 - enter or revise RT Bronchodilator order(s) to two equivalent RT bronchodilator orders with one order with BID Frequency and one order with Frequency of every 4 hours PRN wheezing or increased work of breathing using Per Protocol order mode.        7-10 - enter or revise RT Bronchodilator order(s) to two equivalent RT bronchodilator orders with one order with TID Frequency and one order with Frequency of every 4 hours PRN wheezing or increased work of breathing using Per Protocol order mode.       11-13 - enter or revise RT Bronchodilator order(s) to one equivalent RT bronchodilator order with QID Frequency and an Albuterol order with Frequency of every 4 hours PRN wheezing or increased work of breathing using Per Protocol order mode.      Greater than 13 - enter or revise RT Bronchodilator order(s) to one equivalent RT bronchodilator order with every 4 hours Frequency and an Albuterol order with Frequency of every 2 hours PRN wheezing or increased work of breathing using Per Protocol order mode.       Electronically signed by Juni Mooney RCP on 3/1/2025 at 7:03 PM  
patient is on this medication at home then do not decrease Frequency below that used at home.    0-3 - enter or revise RT bronchodilator order(s) to equivalent RT Bronchodilator order with Frequency of every 4 hours PRN for wheezing or increased work of breathing using Per Protocol order mode.        4-6 - enter or revise RT Bronchodilator order(s) to two equivalent RT bronchodilator orders with one order with BID Frequency and one order with Frequency of every 4 hours PRN wheezing or increased work of breathing using Per Protocol order mode.        7-10 - enter or revise RT Bronchodilator order(s) to two equivalent RT bronchodilator orders with one order with TID Frequency and one order with Frequency of every 4 hours PRN wheezing or increased work of breathing using Per Protocol order mode.       11-13 - enter or revise RT Bronchodilator order(s) to one equivalent RT bronchodilator order with QID Frequency and an Albuterol order with Frequency of every 4 hours PRN wheezing or increased work of breathing using Per Protocol order mode.      Greater than 13 - enter or revise RT Bronchodilator order(s) to one equivalent RT bronchodilator order with every 4 hours Frequency and an Albuterol order with Frequency of every 2 hours PRN wheezing or increased work of breathing using Per Protocol order mode.     RT to enter RT Home Evaluation for COPD & MDI Assessment order using Per Protocol order mode.    Electronically signed by Damaris Candelario on 2/24/2025 at 7:47 AM  
   < > = values in this interval not displayed.     LIVER PROFILE:   Recent Labs     02/23/25  1406   AST 59*   ALT 41*   BILITOT 0.3   ALKPHOS 87     PT/INR: No results for input(s): \"PROTIME\", \"INR\" in the last 72 hours.  APTT: No results for input(s): \"APTT\" in the last 72 hours.  UA:  Recent Labs     02/24/25  0424   COLORU Yellow   PHUR 6.0   WBCUA 3-5   RBCUA 3-4   MUCUS 2+*   BACTERIA 2+*   CLARITYU Clear   LEUKOCYTESUR Negative   UROBILINOGEN 0.2   BILIRUBINUR Negative   BLOODU MODERATE*   GLUCOSEU Negative     Results in Past 30 Days  Result Component Current Result Ref Range Previous Result Ref Range   Troponin, High Sensitivity 12 (2/23/2025) 0 - 22 ng/L Not in Time Range         Results in Past 30 Days  Result Component Current Result Ref Range Previous Result Ref Range   NT Pro-BNP 99 (2/23/2025) 0 - 124 pg/mL Not in Time Range         Cultures:    Results       Procedure Component Value Units Date/Time    Culture, Respiratory [[id number]] Collected: 02/24/25 1007    Order Status: Completed Specimen: Sputum Expectorated Updated: 02/26/25 1111     CULTURE, RESPIRATORY Normal respiratory bryan     Gram Stain Result 1+ Gram positive cocci  1+ Gram positive rods  1+ WBC's  1+ Epithelial Cells      Narrative:      ORDER#: [id number]                          ORDERED BY: BUBBA ANGULO  SOURCE: Sputum Expectorated                COLLECTED:  02/24/25 10:07  ANTIBIOTICS AT MIHAI.:                      RECEIVED :  02/24/25 10:07    Strep Pneumoniae Antigen [[id number]] Collected: 02/24/25 0424    Order Status: Completed Specimen: Urine voided Updated: 02/25/25 0849     STREP PNEUMONIAE ANTIGEN, URINE --     Presumptive Negative  Presumptive negative suggests no current or recent  pneumococcal infection. Infection due to Strep pneumoniae  cannot be ruled out since the antigen present in the sample  may be below the detection limit of the test.  Normal Range:Presumptive Negative      Narrative:      ORDER#: [id number]    
  Monitor blood glucose for hyperglycemia.  Doxy, Ceftriaxone D#3 Tamiflu D#5  Follow up Cx   Acapella and Mucinex  Home medications were addressed   BM regiment   Blood sugar control ISS, with goal 150-180  DVT prophylaxis: Lovenox  MRSA prophylaxis: Bactroban    
Cx   Acapella and Mucinex  Electrolytes replacement   Home medications were addressed   D/C Clonidine   BM regiment   Blood sugar control ISS, with goal 150-180  DVT prophylaxis: Lovenox  MRSA prophylaxis: Bactroban  I discussed care plan with family at the bedside and multiple good questions were answered.          This patient is critically ill. I spent 31 minutes directly engaged in the delivery of critical care to this patient, which was directed towards the patient's critical illness as listed above. This included direct patient contact, management of life support systems review of data (i.e.: imaging and lab), discussion with team members, and this time excludes time spent on procedures.        
medications were addressed   BM regiment   Blood sugar control ISS, with goal 150-180  DVT prophylaxis: Lovenox  MRSA prophylaxis: Bactroban  Okay to move out of the ICU from our perspective     
prophylaxis: Bactroban  2W    
MIHAI.:                      RECEIVED :  02/24/25 02:10  If child <=2 yrs old please draw pediatric bottle.~Blood Culture 1    COVID-19, Flu A/B, and RSV Combo [[id number]]  (Abnormal) Collected: 02/23/25 1439    Order Status: Completed Specimen: Nasopharyngeal Updated: 02/23/25 1500     SARS-CoV-2 RNA, RT PCR NOT DETECTED     Comment: Not Detected results do not preclude SARS-CoV-2 infection and  should not be used as the sole basis for patient management  decisions.  Results must be combined with clinical observations,  patient history, and epidemiological information.  Testing was performed using [employer] SARS-CoV-2, Influenza A/B  and Respiratory Syncytial Virus nucleic acid assay. This  test is a multiplex Real-Time Reverse Transcriptase Polymerase Chain  Reaction (RT-PCR)-based in vitro diagnostic test intended for the  qualitative detection of nucleic acids from SARS-CoV-2,  influenza A,influenza B and Respiratory Syncytial Virus  in nasopharyngeal and nasal swab specimens.    Patient Fact Sheet:  https://www.fda.gov/media/306405/download  Provider Fact Sheet: https://www.fda.gov/media/980509/download  EUA: https://www.fda.gov/media/369765/download  IFU: https://www.fda.gov/media/028384/download    Methodology:  RT-PCR          RSV by PCR NOT DETECTED     Influenza A DETECTED     Influenza B NOT DETECTED           Films:    XR CHEST PORTABLE   Final Result   Opacity at the right mid and lower lung zones is unchanged from prior.         XR CHEST PORTABLE   Final Result   Interval development of right lung pneumonia.  Follow-up exam recommended.                Assessment:      Principal Problem:    Acute respiratory failure with hypoxia (HCC)  Active Problems:    Hyponatremia    Anxiety    Sleep apnea    Gout    GERD (gastroesophageal reflux disease)    Benign essential HTN    Pneumonia due to infectious organism    Influenza A    GRACIE (acute kidney injury)    Bronchospasm    Disorder of electrolytes    Acute 
               Assessment:      Principal Problem:    Acute respiratory failure with hypoxia (HCC)  Active Problems:    Hyponatremia    Anxiety    Sleep apnea    Gout    GERD (gastroesophageal reflux disease)    Benign essential HTN    Pneumonia due to infectious organism    Influenza A    GRACIE (acute kidney injury)    Bronchospasm    Disorder of electrolytes    Acute kidney injury    Multifocal pneumonia    Acute hypoxemic respiratory failure (HCC)  Resolved Problems:    * No resolved hospital problems. *       Plan:    # Acute respiratory failure due to hypoxia.  Placed on BiPAP.  Presently on Vapotherm.  Try to wean oxygen level down.  Uses BiPAP at night.  Pulmonology consultation.  Breathing treatments and steroids. Off Vapo therm. On 10 L. I decreased it to 8 L. Try to wean further.    # Influenza A causing respiratory failure.  Droplet isolation.  Started on Tamiflu.    # Pneumonia.  Likely from gram-positive organism.  Started on Vanco and cefepime.  I changed to Doxy and Rocephin.  Blood cultures ordered.  There are no growth so far    # Hyponatremia likely due to poor p.o. intake.  Much improved.  Nephrology seeing patient    # Mild GRACIE.  Resolved.    # Primary hypertension.  On Norvasc and losartan.      # Lovenox for DVT prophylaxis.        Can transfer as we wean oxygen down.      Note above makes patient higher risk for morbidity and mortality requiring testing and treatment.       All questions and concerns were addressed to the patient/family. Alternatives to my treatment were discussed. The note was completed using EMR. Every effort was made to ensure accuracy; however, inadvertent computerized transcription errors may be present.         EDISON MENDIETA MD 9:15 AM 2/27/2025                       
electrolytes    Acute kidney injury    Multifocal pneumonia    Acute hypoxemic respiratory failure (HCC)  Resolved Problems:    * No resolved hospital problems. *       Plan:    # Acute respiratory failure due to hypoxia.  Improving. On 8 L. I weaned him to 6 L.   Wean O2 as tolerated    # Influenza A causing respiratory failure.  Droplet isolation.    Started on Tamiflu.    # Pneumonia.  Likely from gram-positive organism.  Started on Vanco and cefepime.  Changed to Doxy and Rocephin.  Blood cultures ordered.  There are no growth so far.   Completed antibiotics    # Hyponatremia likely due to poor p.o. intake.  Much improved.  Nephrology seeing patient    # Mild GRACIE.  Resolved.    # Primary hypertension.  On Norvasc and losartan.  Hold losartan.    # Lovenox for DVT prophylaxis.      Med/Surg Patient  Wean O2.  Possible home with home O2 tomorrow if can further wean oxygen    Note above makes patient higher risk for morbidity and mortality requiring testing and treatment.       Jasmin Luis DO 7:04 AM 3/1/2025                       
organism    Influenza A    GRACIE (acute kidney injury)    Bronchospasm    Disorder of electrolytes    Acute kidney injury    Multifocal pneumonia    Acute hypoxemic respiratory failure (HCC)  Resolved Problems:    * No resolved hospital problems. *       Plan:    # Acute respiratory failure due to hypoxia.  Placed on BiPAP.  Presently on Vapotherm.  Try to wean oxygen level down.  Uses BiPAP at night.  Pulmonology consultation.  Breathing treatments and steroids.    # Influenza A causing respiratory failure.  Droplet isolation.  Started on Tamiflu.    # Pneumonia.  Likely from gram-positive organism.  Started on Vanco and cefepime.  I changed to Doxy and Rocephin.  Blood cultures ordered.  There are no growth so far    # Hyponatremia likely due to poor p.o. intake.  Much improved.  Nephrology seeing patient    # Mild GRACIE.  Resolved.    # Primary hypertension.  On Norvasc and losartan.  Hold losartan.    # Lovenox for DVT prophylaxis.    Continue ICU care      Note above makes patient higher risk for morbidity and mortality requiring testing and treatment.       All questions and concerns were addressed to the patient/family. Alternatives to my treatment were discussed. The note was completed using EMR. Every effort was made to ensure accuracy; however, inadvertent computerized transcription errors may be present.         EDISON MENDIETA MD 1:19 PM 2/25/2025

## 2025-03-02 NOTE — CARE COORDINATION
3:56 PM  MENDEL observed DC order. MENDEL met with pt at bedside. FLORIANK discussed needing to DC with home O2. Pt stated he was open to [facility] referral and agreed to referral to [facility]. MENDEL also discussed that PT/OT was ordered late to work with pt. FLORIANK discussed if C was rec'd if pt would be agreeable. Pt stated yes and would be open to [facility] referral.    MENDEL called and spoke with Germania @ [facility]. Germania stated that pt is approved for O2 and for [facility] to deliver Oxlife to bedside. Germania stated they will follow up with pt tomorrow morning.     4:15 PM  MENDEL delivered [facility] O2 Oxlife to bedside.    MENDEL spoke in person with PT Sundar about late PT/OT orders. Sundar stated they will eval patient next in order to be able to DC today.    4:30 PM  MENDEL received call from PT Sundar who stated pt is just being rec'd for home with no services.     MENDEL noted pt will be DC-ing back home and will be transported by [facility].   No further CM needs.   Pt's O2 is 94% on 1L NC.     No IMM needed.     MENDEL informed RN pt is ready for DC from CM standpoint.    Electronically signed by STEFFI Cox on 3/2/2025 at 4:32 PM  
[place] [facility] with [facility] other. IPTA+drives. DME: none. Plans to return home.   Sig other to transport.     Patient currently on 8L. Still weaning. Could potentially need 02 due to influenza/pneumonia.   
provided with a choice of provider and agrees with the discharge plan. Freedom of choice list with basic dialogue that supports the patient's individualized plan of care/goals and shares the quality data associated with the providers was provided to:     Patient Representative Name:       The Patient and/or Patient Representative Agree with the Discharge Plan?      Teresa Boeck, RN  Case Management Department  Ph: [phone]

## 2025-03-02 NOTE — FLOWSHEET NOTE
03/02/25 0735   Handoff   Communication Given Shift Handoff   Handoff Given To Zaynab Rivero   Handoff Received From Ca   Handoff Communication Face to Face;At bedside   Time Handoff Given 0735   End of Shift Check Performed Yes     Pt awake in bed.  Denies needs. Call light within reach.

## 2025-03-02 NOTE — PLAN OF CARE
Problem: Discharge Planning  Goal: Discharge to home or other facility with appropriate resources  2/27/2025 2304 by Sheba Zaapta RN  Outcome: Progressing  2/27/2025 2304 by Sheba Zapata RN  Outcome: Progressing     Problem: Skin/Tissue Integrity  Goal: Skin integrity remains intact  Description: 1.  Monitor for areas of redness and/or skin breakdown  2.  Assess vascular access sites hourly  3.  Every 4-6 hours minimum:  Change oxygen saturation probe site  4.  Every 4-6 hours:  If on nasal continuous positive airway pressure, respiratory therapy assess nares and determine need for appliance change or resting period  2/27/2025 2304 by Sheba Zapata RN  Outcome: Progressing  2/27/2025 2304 by Sheba Zapata RN  Outcome: Progressing  2/27/2025 1221 by Lee Ann Adams RN  Outcome: Progressing  Flowsheets (Taken 2/27/2025 1221)  Skin Integrity Remains Intact:   Monitor for areas of redness and/or skin breakdown   Assess vascular access sites hourly   Every 4-6 hours minimum: Change oxygen saturation probe site   Every 4-6 hours: If on nasal continuous positive airway pressure, respiratory therapy assesses nares and determine need for appliance change or resting period     Problem: ABCDS Injury Assessment  Goal: Absence of physical injury  2/27/2025 2304 by Sheba Zapata RN  Outcome: Progressing  2/27/2025 2304 by Sheba Zapata RN  Outcome: Progressing  2/27/2025 1221 by Lee Ann Adams RN  Outcome: Progressing  Flowsheets (Taken 2/27/2025 1221)  Absence of Physical Injury: Implement safety measures based on patient assessment     Problem: Safety - Adult  Goal: Free from fall injury  2/27/2025 2304 by Sheba Zapata RN  Outcome: Progressing  2/27/2025 2304 by Sheba Zapata RN  Outcome: Progressing     Problem: Respiratory - Adult  Goal: Achieves optimal ventilation and oxygenation  Outcome: Progressing     
  Problem: Discharge Planning  Goal: Discharge to home or other facility with appropriate resources  Outcome: Progressing     Problem: Skin/Tissue Integrity  Goal: Skin integrity remains intact  Description: 1.  Monitor for areas of redness and/or skin breakdown  2.  Assess vascular access sites hourly  3.  Every 4-6 hours minimum:  Change oxygen saturation probe site  4.  Every 4-6 hours:  If on nasal continuous positive airway pressure, respiratory therapy assess nares and determine need for appliance change or resting period  Outcome: Progressing     Problem: ABCDS Injury Assessment  Goal: Absence of physical injury  Outcome: Progressing     Problem: Safety - Adult  Goal: Free from fall injury  Outcome: Progressing     Problem: Respiratory - Adult  Goal: Achieves optimal ventilation and oxygenation  Outcome: Progressing     
  Problem: Discharge Planning  Goal: Discharge to home or other facility with appropriate resources  Outcome: Progressing  Flowsheets  Taken 2/25/2025 0613  Discharge to home or other facility with appropriate resources:   Identify barriers to discharge with patient and caregiver   Arrange for needed discharge resources and transportation as appropriate  Taken 2/24/2025 2030  Discharge to home or other facility with appropriate resources:   Identify barriers to discharge with patient and caregiver   Arrange for needed discharge resources and transportation as appropriate     Problem: Safety - Adult  Goal: Free from fall injury  Outcome: Progressing  Note: Instruct patient to call for assistance prior to getting out of bed     
  Problem: Discharge Planning  Goal: Discharge to home or other facility with appropriate resources  Outcome: Progressing  Flowsheets (Taken 2/26/2025 0245)  Discharge to home or other facility with appropriate resources:   Identify barriers to discharge with patient and caregiver   Arrange for needed discharge resources and transportation as appropriate     Problem: Skin/Tissue Integrity  Goal: Skin integrity remains intact  Description: 1.  Monitor for areas of redness and/or skin breakdown  2.  Assess vascular access sites hourly  3.  Every 4-6 hours minimum:  Change oxygen saturation probe site  4.  Every 4-6 hours:  If on nasal continuous positive airway pressure, respiratory therapy assess nares and determine need for appliance change or resting period  2/25/2025 1418 by Lee Ann Adams, RN  Outcome: Progressing  Flowsheets (Taken 2/25/2025 1418)  Skin Integrity Remains Intact:   Monitor for areas of redness and/or skin breakdown   Assess vascular access sites hourly   Every 4-6 hours minimum: Change oxygen saturation probe site   Every 4-6 hours: If on nasal continuous positive airway pressure, respiratory therapy assesses nares and determine need for appliance change or resting period     Problem: ABCDS Injury Assessment  Goal: Absence of physical injury  Outcome: Progressing  Flowsheets (Taken 2/26/2025 0245)  Absence of Physical Injury: Implement safety measures based on patient assessment     Problem: Safety - Adult  Goal: Free from fall injury  2/25/2025 1418 by Lee Ann Adams, RN  Outcome: Progressing  Flowsheets (Taken 2/25/2025 1418)  Free From Fall Injury:   Instruct family/caregiver on patient safety   Based on caregiver fall risk screen, instruct family/caregiver to ask for assistance with transferring infant if caregiver noted to have fall risk factors     
  Problem: Skin/Tissue Integrity  Goal: Skin integrity remains intact  Description: 1.  Monitor for areas of redness and/or skin breakdown  2.  Assess vascular access sites hourly  3.  Every 4-6 hours minimum:  Change oxygen saturation probe site  4.  Every 4-6 hours:  If on nasal continuous positive airway pressure, respiratory therapy assess nares and determine need for appliance change or resting period  2/26/2025 1250 by Lee Ann Adams, RN  Outcome: Progressing  Flowsheets (Taken 2/26/2025 1250)  Skin Integrity Remains Intact:   Monitor for areas of redness and/or skin breakdown   Assess vascular access sites hourly   Every 4-6 hours minimum: Change oxygen saturation probe site   Every 4-6 hours: If on nasal continuous positive airway pressure, respiratory therapy assesses nares and determine need for appliance change or resting period     Problem: Safety - Adult  Goal: Free from fall injury  2/26/2025 1250 by Lee Ann Adams, RN  Outcome: Progressing  Flowsheets (Taken 2/26/2025 1250)  Free From Fall Injury: Instruct family/caregiver on patient safety     
  Problem: Skin/Tissue Integrity  Goal: Skin integrity remains intact  Description: 1.  Monitor for areas of redness and/or skin breakdown  2.  Assess vascular access sites hourly  3.  Every 4-6 hours minimum:  Change oxygen saturation probe site  4.  Every 4-6 hours:  If on nasal continuous positive airway pressure, respiratory therapy assess nares and determine need for appliance change or resting period  Outcome: Progressing  Flowsheets (Taken 2/25/2025 1418)  Skin Integrity Remains Intact:   Monitor for areas of redness and/or skin breakdown   Assess vascular access sites hourly   Every 4-6 hours minimum: Change oxygen saturation probe site   Every 4-6 hours: If on nasal continuous positive airway pressure, respiratory therapy assesses nares and determine need for appliance change or resting period     Problem: Safety - Adult  Goal: Free from fall injury  2/25/2025 1418 by Lee Ann Adams RN  Outcome: Progressing  Flowsheets (Taken 2/25/2025 1418)  Free From Fall Injury:   Instruct family/caregiver on patient safety   Based on caregiver fall risk screen, instruct family/caregiver to ask for assistance with transferring infant if caregiver noted to have fall risk factors     
  Problem: Skin/Tissue Integrity  Goal: Skin integrity remains intact  Description: 1.  Monitor for areas of redness and/or skin breakdown  2.  Assess vascular access sites hourly  3.  Every 4-6 hours minimum:  Change oxygen saturation probe site  4.  Every 4-6 hours:  If on nasal continuous positive airway pressure, respiratory therapy assess nares and determine need for appliance change or resting period  Outcome: Progressing  Flowsheets (Taken 2/27/2025 1221)  Skin Integrity Remains Intact:   Monitor for areas of redness and/or skin breakdown   Assess vascular access sites hourly   Every 4-6 hours minimum: Change oxygen saturation probe site   Every 4-6 hours: If on nasal continuous positive airway pressure, respiratory therapy assesses nares and determine need for appliance change or resting period     Problem: ABCDS Injury Assessment  Goal: Absence of physical injury  Outcome: Progressing  Flowsheets (Taken 2/27/2025 1221)  Absence of Physical Injury: Implement safety measures based on patient assessment     
  Problem: Skin/Tissue Integrity  Goal: Skin integrity remains intact  Description: 1.  Monitor for areas of redness and/or skin breakdown  2.  Assess vascular access sites hourly  3.  Every 4-6 hours minimum:  Change oxygen saturation probe site  4.  Every 4-6 hours:  If on nasal continuous positive airway pressure, respiratory therapy assess nares and determine need for appliance change or resting period  Outcome: Progressing  Problem: ABCDS Injury Assessment  Goal: Absence of physical injury  Outcome: Progressing  Absence of Physical Injury: Implement safety measures based on patient assessment     Problem: Respiratory - Adult  Goal: Achieves optimal ventilation and oxygenation  Outcome: Progressing  Flowsheets (Taken 2/28/2025 1926)  Achieves optimal ventilation and oxygenation:   Assess for changes in respiratory status   Assess for changes in mentation and behavior   Position to facilitate oxygenation and minimize respiratory effort   Encourage broncho-pulmonary hygiene including cough, deep breathe, incentive spirometry   Assess and instruct to report shortness of breath or any respiratory difficulty   Respiratory therapy support as indicated     Skin Integrity Remains Intact:   Monitor for areas of redness and/or skin breakdown   Assess vascular access sites hourly   Every 4-6 hours minimum: Change oxygen saturation probe site   Every 4-6 hours: If on nasal continuous positive airway pressure, respiratory therapy assesses nares and determine need for appliance change or resting period   Pt down to 6L/HFNC.   
  This is a 60 yo male with sleep apnea, anxiety, gout, p/w shortness of breath.     +flu A  [facility]  [facility]    86% on room air -> on 5 L -> switched to high flow but will need BiPAP.    ICU  
0130 by Gerson Lam RN  Outcome: Progressing  Flowsheets (Taken 3/1/2025 2130)  Absence of infection at discharge: Assess and monitor for signs and symptoms of infection     Problem: Metabolic/Fluid and Electrolytes - Adult  Goal: Electrolytes maintained within normal limits  3/2/2025 1036 by Greyson Kunz RN  Outcome: Progressing  3/2/2025 0130 by Gerson Lam RN  Outcome: Progressing  Flowsheets (Taken 3/1/2025 2130)  Electrolytes maintained within normal limits: Monitor labs and assess patient for signs and symptoms of electrolyte imbalances     
Metabolic/Fluid and Electrolytes - Adult  Goal: Electrolytes maintained within normal limits  Outcome: Progressing  Flowsheets (Taken 3/1/2025 2130)  Electrolytes maintained within normal limits: Monitor labs and assess patient for signs and symptoms of electrolyte imbalances

## 2025-03-03 ENCOUNTER — TELEPHONE (OUTPATIENT)
Dept: PULMONOLOGY | Age: 60
End: 2025-03-03

## 2025-03-03 DIAGNOSIS — J96.01 ACUTE HYPOXEMIC RESPIRATORY FAILURE (HCC): Primary | ICD-10-CM

## 2025-03-03 NOTE — TELEPHONE ENCOUNTER
Patient was seen by Dr. Rangel in the hospital and wanted to clarify the need for oxygen and also needed to know what liter of oxygen to be on. Please advise.

## 2025-03-04 NOTE — TELEPHONE ENCOUNTER
Check 6MW  Uses O2 if desaturation to <90%  Advise to titrate O2 using her pulse oximeter- target O2 sat 90-92% rest and exertion

## 2025-03-26 PROBLEM — J10.1 INFLUENZA A: Status: RESOLVED | Noted: 2025-02-24 | Resolved: 2025-03-26

## 2025-03-28 ENCOUNTER — HOSPITAL ENCOUNTER (OUTPATIENT)
Dept: GENERAL RADIOLOGY | Age: 60
Discharge: HOME | End: 2025-03-28
Payer: COMMERCIAL

## 2025-03-28 ENCOUNTER — HOSPITAL ENCOUNTER (OUTPATIENT)
Age: 60
Discharge: HOME | End: 2025-03-28
Payer: COMMERCIAL

## 2025-03-28 DIAGNOSIS — R06.02 SOB (SHORTNESS OF BREATH): ICD-10-CM

## 2025-03-28 PROCEDURE — 71046 X-RAY EXAM CHEST 2 VIEWS: CPT

## 2025-04-16 ENCOUNTER — OFFICE VISIT (OUTPATIENT)
Dept: PULMONOLOGY | Age: 60
End: 2025-04-16
Payer: COMMERCIAL

## 2025-04-16 VITALS
BODY MASS INDEX: 32.64 KG/M2 | SYSTOLIC BLOOD PRESSURE: 154 MMHG | HEIGHT: 72 IN | RESPIRATION RATE: 16 BRPM | DIASTOLIC BLOOD PRESSURE: 74 MMHG | OXYGEN SATURATION: 96 % | WEIGHT: 241 LBS | HEART RATE: 100 BPM

## 2025-04-16 DIAGNOSIS — R93.89 ABNORMAL CXR: Primary | ICD-10-CM

## 2025-04-16 PROCEDURE — 3077F SYST BP >= 140 MM HG: CPT | Performed by: INTERNAL MEDICINE

## 2025-04-16 PROCEDURE — 3078F DIAST BP <80 MM HG: CPT | Performed by: INTERNAL MEDICINE

## 2025-04-16 PROCEDURE — 99213 OFFICE O/P EST LOW 20 MIN: CPT | Performed by: INTERNAL MEDICINE

## 2025-04-16 RX ORDER — ATORVASTATIN CALCIUM 20 MG
1 TABLET ORAL DAILY
COMMUNITY
Start: 2025-02-01

## 2025-04-16 RX ORDER — CALCIUM CARBONATE 500 MG/1
TABLET, CHEWABLE ORAL PRN
COMMUNITY
Start: 2025-03-13

## 2025-04-16 RX ORDER — BUDESONIDE AND FORMOTEROL FUMARATE DIHYDRATE 160; 4.5 UG/1; UG/1
AEROSOL RESPIRATORY (INHALATION)
COMMUNITY
Start: 2025-04-15

## 2025-04-16 RX ORDER — LOSARTAN POTASSIUM AND HYDROCHLOROTHIAZIDE 100; 12.5 MG/1; MG/1
1 TABLET, FILM COATED ORAL DAILY
COMMUNITY
Start: 2024-07-22

## 2025-04-16 RX ORDER — PANTOPRAZOLE SODIUM 40 MG/1
40 TABLET, DELAYED RELEASE ORAL DAILY
COMMUNITY

## 2025-04-16 RX ORDER — ALBUTEROL SULFATE 90 UG/1
INHALANT RESPIRATORY (INHALATION)
COMMUNITY
Start: 2025-02-22

## 2025-04-16 NOTE — PROGRESS NOTES
[facility]                                                                    CHIEF COMPLAINT: Follow-up hospitalization        HPI:   Recent admission for influenza A multifocal pneumonia  Still with some SOB, active can walk as far as he wants   No cough or sputum  No hemoptysis   No history of asthma or seasonal allergy   No known lung disease   Prior records reviewed by me and summarized.    Past Medical History:   Diagnosis Date    Anxiety     Asthma     Back pain     Gout     Hypertension     Routine health maintenance     echo 11/07,     Sleep apnea     mild, sleep study 11/07       Past Surgical History:        Procedure Laterality Date    COLONOSCOPY  11/16/2015    diverticulosis    ENDOSCOPY, COLON, DIAGNOSTIC  2015    EGD Gastritis       Allergies:  has no known allergies.  Social History:    TOBACCO:   reports that he has never smoked. He has never been exposed to tobacco smoke. He quit smokeless tobacco use about 15 years ago.  His smokeless tobacco use included snuff.  ETOH:   reports current alcohol use of about 12.5 standard drinks of alcohol per week.      Family History:   No family history on file.    Current Medications:    Current Outpatient Medications:     albuterol sulfate HFA (PROVENTIL;VENTOLIN;PROAIR) 108 (90 Base) MCG/ACT inhaler, TAKE 2 PUFFS BY MOUTH EVERY 4 TO 6 HOURS AS NEEDED, Disp: , Rfl:     LIPITOR 20 MG tablet, Take 1 tablet by mouth daily, Disp: , Rfl:     budesonide-formoterol (SYMBICORT) 160-4.5 MCG/ACT AERO, , Disp: , Rfl:     calcium carbonate (TUMS) 500 MG chewable tablet, as needed, Disp: , Rfl:     HYZAAR 100-12.5 MG per tablet, Take 1 tablet by mouth daily, Disp: , Rfl:     pantoprazole (PROTONIX) 40 MG tablet, Take 1 tablet by mouth daily, Disp: , Rfl:     thiamine 100 MG tablet, Take 1 tablet by mouth daily, Disp: 30 tablet, Rfl: 3    omeprazole (PRILOSEC) 20 MG delayed release capsule, Take 2

## 2025-05-02 ENCOUNTER — HOSPITAL ENCOUNTER (OUTPATIENT)
Age: 60
Discharge: HOME | End: 2025-05-02
Payer: COMMERCIAL

## 2025-05-02 ENCOUNTER — HOSPITAL ENCOUNTER (OUTPATIENT)
Dept: GENERAL RADIOLOGY | Age: 60
Discharge: HOME | End: 2025-05-02
Payer: COMMERCIAL

## 2025-05-02 DIAGNOSIS — J18.9 PNEUMONIA DUE TO INFECTIOUS ORGANISM, UNSPECIFIED LATERALITY, UNSPECIFIED PART OF LUNG: ICD-10-CM

## 2025-05-02 PROCEDURE — 71046 X-RAY EXAM CHEST 2 VIEWS: CPT
